# Patient Record
Sex: FEMALE | Race: WHITE | Employment: OTHER | URBAN - METROPOLITAN AREA
[De-identification: names, ages, dates, MRNs, and addresses within clinical notes are randomized per-mention and may not be internally consistent; named-entity substitution may affect disease eponyms.]

---

## 2017-05-17 ENCOUNTER — HOSPITAL ENCOUNTER (OUTPATIENT)
Dept: INTERVENTIONAL RADIOLOGY/VASCULAR | Age: 72
Discharge: HOME | End: 2017-05-17
Payer: COMMERCIAL

## 2017-05-17 VITALS
DIASTOLIC BLOOD PRESSURE: 75 MMHG | HEART RATE: 94 BPM | OXYGEN SATURATION: 100 % | RESPIRATION RATE: 16 BRPM | HEIGHT: 65 IN | SYSTOLIC BLOOD PRESSURE: 117 MMHG | WEIGHT: 250 LBS | BODY MASS INDEX: 41.65 KG/M2 | TEMPERATURE: 97.7 F

## 2017-05-17 DIAGNOSIS — N18.6 ESRD (END STAGE RENAL DISEASE) (HCC): ICD-10-CM

## 2017-05-17 LAB
INR BLD: 1
PARTIAL THROMBOPLASTIN TIME: 24.2 SEC (ref 23–31)
PLATELET # BLD: 150 K/UL (ref 150–450)
POTASSIUM SERPL-SCNC: 3.8 MMOL/L (ref 3.7–5.3)
PROTHROMBIN TIME: 10.3 SEC (ref 9.7–12)

## 2017-05-17 PROCEDURE — 2580000003 HC RX 258: Performed by: RADIOLOGY

## 2017-05-17 PROCEDURE — 85730 THROMBOPLASTIN TIME PARTIAL: CPT

## 2017-05-17 PROCEDURE — 7100000030 HC ASPR PHASE II RECOVERY - FIRST 15 MIN

## 2017-05-17 PROCEDURE — 85049 AUTOMATED PLATELET COUNT: CPT

## 2017-05-17 PROCEDURE — 84132 ASSAY OF SERUM POTASSIUM: CPT

## 2017-05-17 PROCEDURE — 85610 PROTHROMBIN TIME: CPT

## 2017-05-17 PROCEDURE — C1894 INTRO/SHEATH, NON-LASER: HCPCS

## 2017-05-17 PROCEDURE — 2500000003 HC RX 250 WO HCPCS: Performed by: RADIOLOGY

## 2017-05-17 PROCEDURE — 36415 COLL VENOUS BLD VENIPUNCTURE: CPT

## 2017-05-17 PROCEDURE — 36901 INTRO CATH DIALYSIS CIRCUIT: CPT | Performed by: RADIOLOGY

## 2017-05-17 PROCEDURE — 7100000031 HC ASPR PHASE II RECOVERY - ADDTL 15 MIN

## 2017-05-17 PROCEDURE — 6360000004 HC RX CONTRAST MEDICATION: Performed by: NURSE PRACTITIONER

## 2017-05-17 RX ORDER — ACETAMINOPHEN 325 MG/1
650 TABLET ORAL EVERY 4 HOURS PRN
Status: DISCONTINUED | OUTPATIENT
Start: 2017-05-17 | End: 2017-05-20 | Stop reason: HOSPADM

## 2017-05-17 RX ORDER — SODIUM CHLORIDE 9 MG/ML
INJECTION, SOLUTION INTRAVENOUS CONTINUOUS
Status: DISCONTINUED | OUTPATIENT
Start: 2017-05-17 | End: 2017-05-20 | Stop reason: HOSPADM

## 2017-05-17 RX ORDER — LIDOCAINE HYDROCHLORIDE 20 MG/ML
INJECTION, SOLUTION EPIDURAL; INFILTRATION; INTRACAUDAL; PERINEURAL
Status: COMPLETED | OUTPATIENT
Start: 2017-05-17 | End: 2017-05-17

## 2017-05-17 RX ADMIN — LIDOCAINE HYDROCHLORIDE 2 ML: 20 INJECTION, SOLUTION EPIDURAL; INFILTRATION; INTRACAUDAL; PERINEURAL at 10:03

## 2017-05-17 RX ADMIN — SODIUM CHLORIDE: 9 INJECTION, SOLUTION INTRAVENOUS at 09:45

## 2017-05-17 RX ADMIN — IOHEXOL 150 ML: 240 INJECTION, SOLUTION INTRATHECAL; INTRAVASCULAR; INTRAVENOUS; ORAL at 10:58

## 2017-05-17 ASSESSMENT — PAIN SCALES - GENERAL: PAINLEVEL_OUTOF10: 0

## 2017-06-05 ENCOUNTER — HOSPITAL ENCOUNTER (OUTPATIENT)
Dept: INTERVENTIONAL RADIOLOGY/VASCULAR | Age: 72
Discharge: HOME | End: 2017-06-05
Payer: COMMERCIAL

## 2017-06-05 VITALS — DIASTOLIC BLOOD PRESSURE: 81 MMHG | OXYGEN SATURATION: 100 % | SYSTOLIC BLOOD PRESSURE: 131 MMHG | HEART RATE: 93 BPM

## 2017-06-05 DIAGNOSIS — N18.6 ESRD (END STAGE RENAL DISEASE) (HCC): ICD-10-CM

## 2017-06-05 PROCEDURE — 2500000003 HC RX 250 WO HCPCS: Performed by: RADIOLOGY

## 2017-06-05 PROCEDURE — 36589 REMOVAL TUNNELED CV CATH: CPT | Performed by: RADIOLOGY

## 2017-06-05 RX ORDER — LIDOCAINE HYDROCHLORIDE 20 MG/ML
INJECTION, SOLUTION INFILTRATION; PERINEURAL
Status: COMPLETED | OUTPATIENT
Start: 2017-06-05 | End: 2017-06-05

## 2017-06-05 RX ORDER — ACETAMINOPHEN 325 MG/1
650 TABLET ORAL EVERY 4 HOURS PRN
Status: DISCONTINUED | OUTPATIENT
Start: 2017-06-05 | End: 2017-06-08 | Stop reason: HOSPADM

## 2017-06-05 RX ADMIN — LIDOCAINE HYDROCHLORIDE 6 ML: 20 INJECTION, SOLUTION INFILTRATION; PERINEURAL at 11:32

## 2017-09-30 ENCOUNTER — HOSPITAL ENCOUNTER (OUTPATIENT)
Age: 72
Setting detail: SPECIMEN
Discharge: HOME | End: 2017-09-30
Payer: COMMERCIAL

## 2017-09-30 LAB
-: ABNORMAL
AMORPHOUS: ABNORMAL
BACTERIA: ABNORMAL
BILIRUBIN URINE: NEGATIVE
CASTS UA: ABNORMAL /LPF (ref 0–2)
COLOR: YELLOW
COMMENT UA: ABNORMAL
CRYSTALS, UA: ABNORMAL /HPF
EPITHELIAL CELLS UA: ABNORMAL /HPF (ref 0–5)
GLUCOSE URINE: NEGATIVE
KETONES, URINE: NEGATIVE
LEUKOCYTE ESTERASE, URINE: ABNORMAL
MUCUS: ABNORMAL
NITRITE, URINE: NEGATIVE
OTHER OBSERVATIONS UA: ABNORMAL
PH UA: 7.5 (ref 5–8)
PROTEIN UA: ABNORMAL
RBC UA: ABNORMAL /HPF (ref 0–2)
RENAL EPITHELIAL, UA: ABNORMAL /HPF
SPECIFIC GRAVITY UA: 1.01 (ref 1–1.03)
TRICHOMONAS: ABNORMAL
TURBIDITY: ABNORMAL
URINE HGB: ABNORMAL
UROBILINOGEN, URINE: NORMAL
WBC UA: ABNORMAL /HPF (ref 0–5)
YEAST: ABNORMAL

## 2017-10-01 LAB
CULTURE: ABNORMAL
CULTURE: ABNORMAL
Lab: ABNORMAL
ORGANISM: ABNORMAL
SPECIMEN DESCRIPTION: ABNORMAL
STATUS: ABNORMAL

## 2017-10-11 ENCOUNTER — APPOINTMENT (OUTPATIENT)
Dept: CT IMAGING | Age: 72
DRG: 023 | End: 2017-10-11
Payer: COMMERCIAL

## 2017-10-11 ENCOUNTER — ANESTHESIA (OUTPATIENT)
Dept: INTERVENTIONAL RADIOLOGY/VASCULAR | Age: 72
DRG: 023 | End: 2017-10-11
Payer: COMMERCIAL

## 2017-10-11 ENCOUNTER — APPOINTMENT (OUTPATIENT)
Dept: INTERVENTIONAL RADIOLOGY/VASCULAR | Age: 72
DRG: 023 | End: 2017-10-11
Payer: COMMERCIAL

## 2017-10-11 ENCOUNTER — HOSPITAL ENCOUNTER (INPATIENT)
Dept: INTERVENTIONAL RADIOLOGY/VASCULAR | Age: 72
Discharge: HOME | DRG: 023 | End: 2017-10-11
Payer: COMMERCIAL

## 2017-10-11 ENCOUNTER — HOSPITAL ENCOUNTER (INPATIENT)
Age: 72
LOS: 8 days | Discharge: HOSPICE | DRG: 023 | End: 2017-10-19
Attending: EMERGENCY MEDICINE | Admitting: INTERNAL MEDICINE
Payer: COMMERCIAL

## 2017-10-11 ENCOUNTER — ANESTHESIA EVENT (OUTPATIENT)
Dept: INTERVENTIONAL RADIOLOGY/VASCULAR | Age: 72
DRG: 023 | End: 2017-10-11
Payer: COMMERCIAL

## 2017-10-11 VITALS — DIASTOLIC BLOOD PRESSURE: 40 MMHG | SYSTOLIC BLOOD PRESSURE: 78 MMHG | OXYGEN SATURATION: 94 %

## 2017-10-11 DIAGNOSIS — I63.9 CEREBROVASCULAR ACCIDENT (CVA), UNSPECIFIED MECHANISM (HCC): ICD-10-CM

## 2017-10-11 DIAGNOSIS — N18.6 ESRD (END STAGE RENAL DISEASE) (HCC): ICD-10-CM

## 2017-10-11 DIAGNOSIS — R29.898 WEAKNESS OF EXTREMITY: Primary | ICD-10-CM

## 2017-10-11 PROBLEM — I63.311 CEREBROVASCULAR ACCIDENT (CVA) DUE TO THROMBOSIS OF RIGHT MIDDLE CEREBRAL ARTERY (HCC): Status: ACTIVE | Noted: 2017-10-11

## 2017-10-11 PROBLEM — E66.01 MORBID OBESITY (HCC): Status: ACTIVE | Noted: 2017-10-11

## 2017-10-11 PROBLEM — G81.94 ACUTE LEFT HEMIPARESIS (HCC): Status: ACTIVE | Noted: 2017-10-11

## 2017-10-11 LAB
% CKMB: 2.1 % (ref 0–3)
ABSOLUTE EOS #: 0 K/UL (ref 0–0.4)
ABSOLUTE EOS #: 0.1 K/UL (ref 0–0.4)
ABSOLUTE LYMPH #: 0.89 K/UL (ref 1–4.8)
ABSOLUTE LYMPH #: 1.5 K/UL (ref 1–4.8)
ABSOLUTE MONO #: 0.6 K/UL (ref 0.1–1.2)
ABSOLUTE MONO #: 1.12 K/UL (ref 0.1–0.8)
ALLEN TEST: ABNORMAL
ALLEN TEST: ABNORMAL
ANION GAP SERPL CALCULATED.3IONS-SCNC: 17 MMOL/L (ref 9–17)
ANION GAP: 9 MMOL/L (ref 7–16)
BASOPHILS # BLD: 0 %
BASOPHILS # BLD: 0 %
BASOPHILS ABSOLUTE: 0 K/UL (ref 0–0.2)
BASOPHILS ABSOLUTE: 0 K/UL (ref 0–0.2)
BUN BLDV-MCNC: 49 MG/DL (ref 8–23)
BUN/CREAT BLD: ABNORMAL (ref 9–20)
CALCIUM SERPL-MCNC: 9 MG/DL (ref 8.6–10.4)
CHLORIDE BLD-SCNC: 97 MMOL/L (ref 98–107)
CK MB: 1.3 NG/ML
CKMB INTERPRETATION: ABNORMAL
CO2: 24 MMOL/L (ref 20–31)
CREAT SERPL-MCNC: 1.8 MG/DL (ref 0.5–0.9)
DIFFERENTIAL TYPE: ABNORMAL
DIFFERENTIAL TYPE: ABNORMAL
EOSINOPHILS RELATIVE PERCENT: 0 %
EOSINOPHILS RELATIVE PERCENT: 1 %
FIO2: 50
FIO2: ABNORMAL
GFR AFRICAN AMERICAN: 33 ML/MIN
GFR NON-AFRICAN AMERICAN: 20 ML/MIN
GFR NON-AFRICAN AMERICAN: 28 ML/MIN
GFR SERPL CREATININE-BSD FRML MDRD: 24 ML/MIN
GFR SERPL CREATININE-BSD FRML MDRD: ABNORMAL ML/MIN/{1.73_M2}
GLUCOSE BLD-MCNC: 91 MG/DL (ref 70–99)
GLUCOSE BLD-MCNC: 93 MG/DL (ref 74–100)
HCO3 VENOUS: 30.8 MMOL/L (ref 22–29)
HCT VFR BLD CALC: 35.9 % (ref 36–46)
HCT VFR BLD CALC: 43.8 % (ref 36–46)
HEMOGLOBIN: 12 G/DL (ref 12–16)
HEMOGLOBIN: 14.6 G/DL (ref 12–16)
INR BLD: 0.9
LYMPHOCYTES # BLD: 21 %
LYMPHOCYTES # BLD: 4 %
MCH RBC QN AUTO: 30.6 PG (ref 26–34)
MCH RBC QN AUTO: 31.3 PG (ref 26–34)
MCHC RBC AUTO-ENTMCNC: 33.5 G/DL (ref 31–37)
MCHC RBC AUTO-ENTMCNC: 33.5 G/DL (ref 31–37)
MCV RBC AUTO: 91.2 FL (ref 80–100)
MCV RBC AUTO: 93.5 FL (ref 80–100)
MODE: ABNORMAL
MODE: ABNORMAL
MONOCYTES # BLD: 5 %
MONOCYTES # BLD: 8 %
MORPHOLOGY: NORMAL
MYOGLOBIN: 132 NG/ML (ref 25–58)
NEGATIVE BASE EXCESS, ART: 1 (ref 0–2)
NEGATIVE BASE EXCESS, VEN: ABNORMAL (ref 0–2)
O2 DEVICE/FLOW/%: ABNORMAL
O2 DEVICE/FLOW/%: ABNORMAL
O2 SAT, VEN: 80 % (ref 60–85)
PARTIAL THROMBOPLASTIN TIME: 21.4 SEC (ref 21.3–31.3)
PATIENT TEMP: ABNORMAL
PATIENT TEMP: ABNORMAL
PCO2, VEN: 47 MM HG (ref 41–51)
PDW BLD-RTO: 13.4 % (ref 12.5–15.4)
PDW BLD-RTO: 14.5 % (ref 12.5–15.4)
PH VENOUS: 7.42 (ref 7.32–7.43)
PLATELET # BLD: 119 K/UL (ref 140–450)
PLATELET # BLD: 196 K/UL (ref 140–450)
PLATELET ESTIMATE: ABNORMAL
PLATELET ESTIMATE: ABNORMAL
PMV BLD AUTO: 8.6 FL (ref 6–12)
PMV BLD AUTO: 8.8 FL (ref 6–12)
PO2, VEN: 43.4 MM HG (ref 30–50)
POC CHLORIDE: 101 MMOL/L (ref 98–107)
POC CREATININE: 2.39 MG/DL (ref 0.51–1.19)
POC HCO3: 23.5 MMOL/L (ref 21–28)
POC HEMATOCRIT: 40 % (ref 36–46)
POC HEMATOCRIT: 48 % (ref 36–46)
POC HEMOGLOBIN: 13.5 G/DL (ref 12–16)
POC HEMOGLOBIN: 16.4 G/DL (ref 12–16)
POC IONIZED CALCIUM: 1.16 MMOL/L (ref 1.15–1.33)
POC LACTIC ACID: 1.19 MMOL/L (ref 0.56–1.39)
POC O2 SATURATION: 98 % (ref 94–98)
POC PCO2 TEMP: ABNORMAL MM HG
POC PCO2 TEMP: ABNORMAL MM HG
POC PCO2: 37.5 MM HG (ref 35–48)
POC PH TEMP: ABNORMAL
POC PH TEMP: ABNORMAL
POC PH: 7.41 (ref 7.35–7.45)
POC PO2 TEMP: ABNORMAL MM HG
POC PO2 TEMP: ABNORMAL MM HG
POC PO2: 112.9 MM HG (ref 83–108)
POC POTASSIUM: 4 MMOL/L (ref 3.5–4.5)
POC SODIUM: 141 MMOL/L (ref 138–146)
POC TROPONIN I: 0 NG/ML (ref 0–0.1)
POC TROPONIN INTERP: NORMAL
POSITIVE BASE EXCESS, ART: ABNORMAL (ref 0–3)
POSITIVE BASE EXCESS, VEN: 5 (ref 0–3)
POTASSIUM SERPL-SCNC: 3.9 MMOL/L (ref 3.7–5.3)
PROTHROMBIN TIME: 10.1 SEC (ref 9.4–12.6)
RBC # BLD: 3.93 M/UL (ref 4–5.2)
RBC # BLD: 4.68 M/UL (ref 4–5.2)
RBC # BLD: ABNORMAL 10*6/UL
RBC # BLD: ABNORMAL 10*6/UL
SAMPLE SITE: ABNORMAL
SAMPLE SITE: ABNORMAL
SEG NEUTROPHILS: 70 %
SEG NEUTROPHILS: 91 %
SEGMENTED NEUTROPHILS ABSOLUTE COUNT: 20.29 K/UL (ref 1.8–7.7)
SEGMENTED NEUTROPHILS ABSOLUTE COUNT: 4.8 K/UL (ref 1.8–7.7)
SODIUM BLD-SCNC: 138 MMOL/L (ref 135–144)
TCO2 (CALC), ART: 25 MMOL/L (ref 22–29)
TOTAL CK: 61 U/L (ref 26–192)
TOTAL CO2, VENOUS: 32 MMOL/L (ref 23–30)
TROPONIN INTERP: ABNORMAL
TROPONIN T: <0.03 NG/ML
WBC # BLD: 22.3 K/UL (ref 3.5–11)
WBC # BLD: 6.9 K/UL (ref 3.5–11)
WBC # BLD: ABNORMAL 10*3/UL
WBC # BLD: ABNORMAL 10*3/UL

## 2017-10-11 PROCEDURE — 93005 ELECTROCARDIOGRAM TRACING: CPT

## 2017-10-11 PROCEDURE — 51702 INSERT TEMP BLADDER CATH: CPT

## 2017-10-11 PROCEDURE — 3E03317 INTRODUCTION OF OTHER THROMBOLYTIC INTO PERIPHERAL VEIN, PERCUTANEOUS APPROACH: ICD-10-PCS | Performed by: PSYCHIATRY & NEUROLOGY

## 2017-10-11 PROCEDURE — 2500000003 HC RX 250 WO HCPCS

## 2017-10-11 PROCEDURE — 61630 BALO ANGIOPLASTY ICR PERQ: CPT | Performed by: PSYCHIATRY & NEUROLOGY

## 2017-10-11 PROCEDURE — B41FYZZ FLUOROSCOPY OF RIGHT LOWER EXTREMITY ARTERIES USING OTHER CONTRAST: ICD-10-PCS | Performed by: PSYCHIATRY & NEUROLOGY

## 2017-10-11 PROCEDURE — 85730 THROMBOPLASTIN TIME PARTIAL: CPT

## 2017-10-11 PROCEDURE — 82330 ASSAY OF CALCIUM: CPT

## 2017-10-11 PROCEDURE — 82435 ASSAY OF BLOOD CHLORIDE: CPT

## 2017-10-11 PROCEDURE — B316YZZ FLUOROSCOPY OF RIGHT INTERNAL CAROTID ARTERY USING OTHER CONTRAST: ICD-10-PCS | Performed by: PSYCHIATRY & NEUROLOGY

## 2017-10-11 PROCEDURE — B313YZZ FLUOROSCOPY OF RIGHT COMMON CAROTID ARTERY USING OTHER CONTRAST: ICD-10-PCS | Performed by: PSYCHIATRY & NEUROLOGY

## 2017-10-11 PROCEDURE — 85025 COMPLETE CBC W/AUTO DIFF WBC: CPT

## 2017-10-11 PROCEDURE — 2000000003 HC NEURO ICU R&B

## 2017-10-11 PROCEDURE — 70450 CT HEAD/BRAIN W/O DYE: CPT

## 2017-10-11 PROCEDURE — 83874 ASSAY OF MYOGLOBIN: CPT

## 2017-10-11 PROCEDURE — 85610 PROTHROMBIN TIME: CPT

## 2017-10-11 PROCEDURE — 83605 ASSAY OF LACTIC ACID: CPT

## 2017-10-11 PROCEDURE — 6360000002 HC RX W HCPCS: Performed by: NURSE ANESTHETIST, CERTIFIED REGISTERED

## 2017-10-11 PROCEDURE — 86920 COMPATIBILITY TEST SPIN: CPT

## 2017-10-11 PROCEDURE — 3700000001 HC ADD 15 MINUTES (ANESTHESIA)

## 2017-10-11 PROCEDURE — 82550 ASSAY OF CK (CPK): CPT

## 2017-10-11 PROCEDURE — C1887 CATHETER, GUIDING: HCPCS

## 2017-10-11 PROCEDURE — 2500000003 HC RX 250 WO HCPCS: Performed by: NURSE ANESTHETIST, CERTIFIED REGISTERED

## 2017-10-11 PROCEDURE — 84484 ASSAY OF TROPONIN QUANT: CPT

## 2017-10-11 PROCEDURE — 99285 EMERGENCY DEPT VISIT HI MDM: CPT

## 2017-10-11 PROCEDURE — 2580000003 HC RX 258: Performed by: NURSE ANESTHETIST, CERTIFIED REGISTERED

## 2017-10-11 PROCEDURE — 82803 BLOOD GASES ANY COMBINATION: CPT

## 2017-10-11 PROCEDURE — 87641 MR-STAPH DNA AMP PROBE: CPT

## 2017-10-11 PROCEDURE — 86901 BLOOD TYPING SEROLOGIC RH(D): CPT

## 2017-10-11 PROCEDURE — 84295 ASSAY OF SERUM SODIUM: CPT

## 2017-10-11 PROCEDURE — 6360000002 HC RX W HCPCS: Performed by: EMERGENCY MEDICINE

## 2017-10-11 PROCEDURE — 6360000004 HC RX CONTRAST MEDICATION: Performed by: PSYCHIATRY & NEUROLOGY

## 2017-10-11 PROCEDURE — 84132 ASSAY OF SERUM POTASSIUM: CPT

## 2017-10-11 PROCEDURE — 82565 ASSAY OF CREATININE: CPT

## 2017-10-11 PROCEDURE — 82947 ASSAY GLUCOSE BLOOD QUANT: CPT

## 2017-10-11 PROCEDURE — 70498 CT ANGIOGRAPHY NECK: CPT

## 2017-10-11 PROCEDURE — 82553 CREATINE MB FRACTION: CPT

## 2017-10-11 PROCEDURE — 70496 CT ANGIOGRAPHY HEAD: CPT

## 2017-10-11 PROCEDURE — 6360000004 HC RX CONTRAST MEDICATION: Performed by: EMERGENCY MEDICINE

## 2017-10-11 PROCEDURE — 36430 TRANSFUSION BLD/BLD COMPNT: CPT

## 2017-10-11 PROCEDURE — 86850 RBC ANTIBODY SCREEN: CPT

## 2017-10-11 PROCEDURE — 6360000002 HC RX W HCPCS: Performed by: NEUROLOGICAL SURGERY

## 2017-10-11 PROCEDURE — P9016 RBC LEUKOCYTES REDUCED: HCPCS

## 2017-10-11 PROCEDURE — 85014 HEMATOCRIT: CPT

## 2017-10-11 PROCEDURE — 80048 BASIC METABOLIC PNL TOTAL CA: CPT

## 2017-10-11 PROCEDURE — 86900 BLOOD TYPING SEROLOGIC ABO: CPT

## 2017-10-11 PROCEDURE — 3700000000 HC ANESTHESIA ATTENDED CARE

## 2017-10-11 PROCEDURE — 2580000003 HC RX 258: Performed by: NEUROLOGICAL SURGERY

## 2017-10-11 PROCEDURE — 61645 PERQ ART M-THROMBECT &/NFS: CPT | Performed by: PSYCHIATRY & NEUROLOGY

## 2017-10-11 PROCEDURE — 6360000002 HC RX W HCPCS

## 2017-10-11 PROCEDURE — 03CG3ZZ EXTIRPATION OF MATTER FROM INTRACRANIAL ARTERY, PERCUTANEOUS APPROACH: ICD-10-PCS | Performed by: PSYCHIATRY & NEUROLOGY

## 2017-10-11 RX ORDER — IODIXANOL 270 MG/ML
200 INJECTION, SOLUTION INTRAVASCULAR
Status: COMPLETED | OUTPATIENT
Start: 2017-10-11 | End: 2017-10-11

## 2017-10-11 RX ORDER — SODIUM CHLORIDE 0.9 % (FLUSH) 0.9 %
10 SYRINGE (ML) INJECTION EVERY 12 HOURS SCHEDULED
Status: DISCONTINUED | OUTPATIENT
Start: 2017-10-11 | End: 2017-10-18

## 2017-10-11 RX ORDER — ACETAMINOPHEN 325 MG/1
650 TABLET ORAL EVERY 4 HOURS PRN
Status: DISCONTINUED | OUTPATIENT
Start: 2017-10-11 | End: 2017-10-18

## 2017-10-11 RX ORDER — EPHEDRINE SULFATE 50 MG/ML
INJECTION, SOLUTION INTRAVENOUS PRN
Status: DISCONTINUED | OUTPATIENT
Start: 2017-10-11 | End: 2017-10-11 | Stop reason: SDUPTHER

## 2017-10-11 RX ORDER — SODIUM CHLORIDE 9 MG/ML
INJECTION, SOLUTION INTRAVENOUS CONTINUOUS PRN
Status: DISCONTINUED | OUTPATIENT
Start: 2017-10-11 | End: 2017-10-11 | Stop reason: SDUPTHER

## 2017-10-11 RX ORDER — ACETAMINOPHEN 325 MG/1
650 TABLET ORAL EVERY 4 HOURS PRN
Status: DISCONTINUED | OUTPATIENT
Start: 2017-10-11 | End: 2017-10-11 | Stop reason: SDUPTHER

## 2017-10-11 RX ORDER — ATORVASTATIN CALCIUM 40 MG/1
40 TABLET, FILM COATED ORAL NIGHTLY
Status: DISCONTINUED | OUTPATIENT
Start: 2017-10-11 | End: 2017-10-18

## 2017-10-11 RX ORDER — HEPARIN SODIUM 1000 [USP'U]/ML
INJECTION, SOLUTION INTRAVENOUS; SUBCUTANEOUS PRN
Status: DISCONTINUED | OUTPATIENT
Start: 2017-10-11 | End: 2017-10-11 | Stop reason: SDUPTHER

## 2017-10-11 RX ORDER — ATORVASTATIN CALCIUM 80 MG/1
80 TABLET, FILM COATED ORAL NIGHTLY
Status: DISCONTINUED | OUTPATIENT
Start: 2017-10-11 | End: 2017-10-11

## 2017-10-11 RX ORDER — SODIUM CHLORIDE 0.9 % (FLUSH) 0.9 %
10 SYRINGE (ML) INJECTION PRN
Status: DISCONTINUED | OUTPATIENT
Start: 2017-10-11 | End: 2017-10-18

## 2017-10-11 RX ORDER — FENTANYL CITRATE 50 UG/ML
INJECTION, SOLUTION INTRAMUSCULAR; INTRAVENOUS PRN
Status: DISCONTINUED | OUTPATIENT
Start: 2017-10-11 | End: 2017-10-11 | Stop reason: SDUPTHER

## 2017-10-11 RX ORDER — CEFAZOLIN SODIUM 1 G/3ML
INJECTION, POWDER, FOR SOLUTION INTRAMUSCULAR; INTRAVENOUS PRN
Status: DISCONTINUED | OUTPATIENT
Start: 2017-10-11 | End: 2017-10-11 | Stop reason: SDUPTHER

## 2017-10-11 RX ORDER — 0.9 % SODIUM CHLORIDE 0.9 %
250 INTRAVENOUS SOLUTION INTRAVENOUS ONCE
Status: DISCONTINUED | OUTPATIENT
Start: 2017-10-11 | End: 2017-10-18

## 2017-10-11 RX ORDER — SODIUM CHLORIDE, SODIUM LACTATE, POTASSIUM CHLORIDE, CALCIUM CHLORIDE 600; 310; 30; 20 MG/100ML; MG/100ML; MG/100ML; MG/100ML
INJECTION, SOLUTION INTRAVENOUS CONTINUOUS PRN
Status: DISCONTINUED | OUTPATIENT
Start: 2017-10-11 | End: 2017-10-11 | Stop reason: SDUPTHER

## 2017-10-11 RX ORDER — DEXTROSE MONOHYDRATE 25 G/50ML
12.5 INJECTION, SOLUTION INTRAVENOUS
Status: ACTIVE | OUTPATIENT
Start: 2017-10-11 | End: 2017-10-11

## 2017-10-11 RX ORDER — ONDANSETRON 2 MG/ML
4 INJECTION INTRAMUSCULAR; INTRAVENOUS EVERY 6 HOURS PRN
Status: DISCONTINUED | OUTPATIENT
Start: 2017-10-11 | End: 2017-10-18

## 2017-10-11 RX ORDER — ONDANSETRON 2 MG/ML
INJECTION INTRAMUSCULAR; INTRAVENOUS PRN
Status: DISCONTINUED | OUTPATIENT
Start: 2017-10-11 | End: 2017-10-11 | Stop reason: SDUPTHER

## 2017-10-11 RX ADMIN — SODIUM CHLORIDE: 9 INJECTION, SOLUTION INTRAVENOUS at 17:27

## 2017-10-11 RX ADMIN — PHENYLEPHRINE HYDROCHLORIDE 100 MCG: 10 INJECTION INTRAMUSCULAR; INTRAVENOUS; SUBCUTANEOUS at 20:15

## 2017-10-11 RX ADMIN — IOPAMIDOL 90 ML: 755 INJECTION, SOLUTION INTRAVENOUS at 17:08

## 2017-10-11 RX ADMIN — FENTANYL CITRATE 50 MCG: 50 INJECTION INTRAMUSCULAR; INTRAVENOUS at 17:39

## 2017-10-11 RX ADMIN — EPHEDRINE SULFATE 5 MG: 50 INJECTION INTRAMUSCULAR; INTRAVENOUS; SUBCUTANEOUS at 21:10

## 2017-10-11 RX ADMIN — PHENYLEPHRINE HYDROCHLORIDE 100 MCG: 10 INJECTION INTRAMUSCULAR; INTRAVENOUS; SUBCUTANEOUS at 20:52

## 2017-10-11 RX ADMIN — EPHEDRINE SULFATE 5 MG: 50 INJECTION INTRAMUSCULAR; INTRAVENOUS; SUBCUTANEOUS at 22:10

## 2017-10-11 RX ADMIN — PHENYLEPHRINE HYDROCHLORIDE 100 MCG: 10 INJECTION INTRAMUSCULAR; INTRAVENOUS; SUBCUTANEOUS at 22:10

## 2017-10-11 RX ADMIN — IODIXANOL 165 ML: 270 INJECTION, SOLUTION INTRAVASCULAR at 21:42

## 2017-10-11 RX ADMIN — PHENYLEPHRINE HYDROCHLORIDE 100 MCG/MIN: 10 INJECTION INTRAVENOUS at 23:30

## 2017-10-11 RX ADMIN — PHENYLEPHRINE HYDROCHLORIDE 50 MCG/MIN: 10 INJECTION INTRAMUSCULAR; INTRAVENOUS; SUBCUTANEOUS at 20:52

## 2017-10-11 RX ADMIN — SODIUM CHLORIDE, POTASSIUM CHLORIDE, SODIUM LACTATE AND CALCIUM CHLORIDE: 600; 310; 30; 20 INJECTION, SOLUTION INTRAVENOUS at 19:50

## 2017-10-11 RX ADMIN — EPHEDRINE SULFATE 10 MG: 50 INJECTION INTRAMUSCULAR; INTRAVENOUS; SUBCUTANEOUS at 22:14

## 2017-10-11 RX ADMIN — ALTEPLASE 81 MG: KIT at 17:13

## 2017-10-11 RX ADMIN — PHENYLEPHRINE HYDROCHLORIDE 400 MCG: 10 INJECTION INTRAMUSCULAR; INTRAVENOUS; SUBCUTANEOUS at 20:36

## 2017-10-11 RX ADMIN — FENTANYL CITRATE 25 MCG: 50 INJECTION INTRAMUSCULAR; INTRAVENOUS at 20:42

## 2017-10-11 RX ADMIN — HEPARIN SODIUM 1000 UNITS: 1000 INJECTION, SOLUTION INTRAVENOUS; SUBCUTANEOUS at 20:00

## 2017-10-11 RX ADMIN — PHENYLEPHRINE HYDROCHLORIDE 200 MCG: 10 INJECTION INTRAMUSCULAR; INTRAVENOUS; SUBCUTANEOUS at 20:25

## 2017-10-11 RX ADMIN — HEPARIN SODIUM 2000 UNITS: 1000 INJECTION, SOLUTION INTRAVENOUS; SUBCUTANEOUS at 17:59

## 2017-10-11 RX ADMIN — PHENYLEPHRINE HYDROCHLORIDE 100 MCG: 10 INJECTION INTRAMUSCULAR; INTRAVENOUS; SUBCUTANEOUS at 20:21

## 2017-10-11 RX ADMIN — ONDANSETRON 4 MG: 2 INJECTION INTRAMUSCULAR; INTRAVENOUS at 20:34

## 2017-10-11 RX ADMIN — EPHEDRINE SULFATE 5 MG: 50 INJECTION INTRAMUSCULAR; INTRAVENOUS; SUBCUTANEOUS at 20:56

## 2017-10-11 RX ADMIN — CEFAZOLIN 2000 MG: 1 INJECTION, POWDER, FOR SOLUTION INTRAMUSCULAR; INTRAVENOUS at 17:41

## 2017-10-11 RX ADMIN — FENTANYL CITRATE 25 MCG: 50 INJECTION INTRAMUSCULAR; INTRAVENOUS at 20:41

## 2017-10-11 ASSESSMENT — ENCOUNTER SYMPTOMS
DIARRHEA: 0
SHORTNESS OF BREATH: 0
COUGH: 0
ABDOMINAL PAIN: 0
VOMITING: 0
NAUSEA: 0
BLURRED VISION: 0
SORE THROAT: 0
HEARTBURN: 0
CHEST TIGHTNESS: 0
CONSTIPATION: 0

## 2017-10-11 NOTE — ED PROVIDER NOTES
Psychiatric/Behavioral: Negative for suicidal ideas. PHYSICAL EXAM   (up to 7 for level 4, 8 or more for level 5)      INITIAL VITALS:   BP (!) 149/79   Pulse 106   Temp 98.1 °F (36.7 °C) (Oral)   Resp 18   Ht 5' 5\" (1.651 m)   Wt 260 lb (117.9 kg)   LMP  (LMP Unknown) Comment: post menopausal  SpO2 97%   BMI 43.27 kg/m²     Physical Exam   Constitutional: She is oriented to person, place, and time. She appears well-developed and well-nourished. No distress. HENT:   Head: Normocephalic and atraumatic. Right Ear: External ear normal.   Left Ear: External ear normal.   Nose: Nose normal.   Eyes: Conjunctivae and EOM are normal. Pupils are equal, round, and reactive to light. Right eye exhibits no discharge. Left eye exhibits no discharge. Neck: Normal range of motion. Neck supple. No JVD present. No tracheal deviation present. Cardiovascular: Regular rhythm and normal heart sounds. Tachycardia present. Exam reveals no gallop and no friction rub. No murmur heard. Pulmonary/Chest: Effort normal and breath sounds normal. No stridor. No respiratory distress. She has no wheezes. She has no rales. She exhibits no tenderness. Abdominal: Soft. Bowel sounds are normal. She exhibits no distension and no mass. There is no tenderness. There is no rebound and no guarding. Musculoskeletal: Normal range of motion. She exhibits no edema, tenderness or deformity. Lymphadenopathy:     She has no cervical adenopathy. Neurological: She is alert and oriented to person, place, and time. A sensory deficit is present. She exhibits abnormal muscle tone. Gait abnormal. GCS eye subscore is 4. GCS verbal subscore is 5. GCS motor subscore is 6.    Mental Status:  A & O x3,awake             Cranial Nerves:    V: Corneal reflex:  not completed  VII: Facial strength: abnormal left-sided facial asymmetry, unequal smile  XI: Shoulder shrug:  abnormal decreased shoulder shrug on the left    Motor Exam:    Drift: signs per transfusion protocol    Transfusion Reaction Management    Verify informed consent    Inpatient consult to Neurology    Inpatient consult to Internal Medicine    Initiate Oxygen Therapy Protocol    Venous Blood Gas, POC    Creatinine W/GFR Point of Care    Lactic Acid, POC    POCT Glucose    Anion Gap (Calc) POC    POCT troponin    TYPE AND SCREEN    Insert peripheral IV    Initiate minimum 2 IV lines for alteplase (tPA) infusion    PATIENT STATUS (FROM ED OR OR/PROCEDURAL) Inpatient    Fall precautions       MEDICATIONS ORDERED:  Orders Placed This Encounter   Medications    sodium chloride flush 0.9 % injection 10 mL    sodium chloride flush 0.9 % injection 10 mL    dextrose 50 % solution 12.5 g    FOLLOWED BY Linked Order Group     alteplase (ACTIVASE) injection 9 mg     alteplase (ACTIVASE) injection 81 mg    iopamidol (ISOVUE-370) 76 % injection 90 mL    0.9 % sodium chloride infusion 250 mL       DDX: Stroke, SAH, subdural/epidural hematoma, hyponatremia, hypoglycemia, MI      NIH: 10  DIAGNOSTIC RESULTS / EMERGENCY DEPARTMENT COURSE / MDM     LABS:  Results for orders placed or performed during the hospital encounter of 10/11/17   STROKE PANEL   Result Value Ref Range    WBC 6.9 3.5 - 11.0 k/uL    RBC 4.68 4.0 - 5.2 m/uL    Hemoglobin 14.6 12.0 - 16.0 g/dL    Hematocrit 43.8 36 - 46 %    MCV 93.5 80 - 100 fL    MCH 31.3 26 - 34 pg    MCHC 33.5 31 - 37 g/dL    RDW 13.4 12.5 - 15.4 %    Platelets 338 (L) 127 - 450 k/uL    MPV 8.6 6.0 - 12.0 fL    Differential Type NOT REPORTED     Seg Neutrophils 70 %    Lymphocytes 21 %    Monocytes 8 %    Eosinophils % 1 %    Basophils 0 %    Segs Absolute 4.80 1.8 - 7.7 k/uL    Absolute Lymph # 1.50 1.0 - 4.8 k/uL    Absolute Mono # 0.60 0.1 - 1.2 k/uL    Absolute Eos # 0.10 0.0 - 0.4 k/uL    Basophils # 0.00 0.0 - 0.2 k/uL    WBC Morphology NOT REPORTED     RBC Morphology NOT REPORTED     Platelet Estimate NOT REPORTED     Protime 10.1 9.4 Prominent great vessels are somewhat tortuous. The left vertebral artery originates from the arch just proximal to the left subclavian artery. CAROTID ARTERIES: The common carotid arteries are normal in appearance without evidence of a flow limiting stenosis. The internal carotid arteries are normal in appearance without evidence of a flow limiting stenosis by NASCET criteria. No dissection or arterial injury is seen. Distal cervical internal carotid arteries are tortuous bilaterally. There is small amount of plaque in the carotid bulbs, left greater than right, but no significant stenosis. VERTEBRAL ARTERIES: The left vertebral origin appears to be at the arch just proximal to the subclavian artery. There is local calcified plaque causing possible moderate stenosis. The right vertebral artery appears normal. SOFT TISSUES:  The lung apices are clear. No cervical or superior mediastinal lymphadenopathy. The visualized portion of the larynx and pharynx appear unremarkable. The parotid, submandibular and thyroid glands demonstrate no acute abnormality. There is an 8 mm calcified lesion in the right lobe of the thyroid. BONES: The visualized osseous structures appear unremarkable. Possible moderate stenosis at the origin of the left vertebral artery at the arch of the aorta. Mild plaque in the carotid bulbs, left greater than right, with no significant stenosis. RECOMMENDATIONS: Managing Incidental Thyroid Nodule Detected at CT or MRI or US 1. Further evaluation by thyroid Ultrasound recommended for these incidental nodules: Patient Age 25 years or less - Any nodule. Patient Age 21-27 years old - Nodule 1 cm in size or greater Patient Age 28 years or more - Nodule 1.5 cm in size or greater 2.  Follow up thyroid ultrasound also recommend in these scenarios -Solitary nodule with high risk imaging features (locally invasive nodule or suspicious lymph nodes) -Any nodule in a heterogeneous enlarged thyroid gland 3.  NO further imaging is recommended in the following scenarios -No f/u imaging is recommended for ITNs not meeting the above criteria. -No US or f/u recommended for ITNs without high risk features in pts. with limited life expectancy or significant co-morbidities, unless clinically warranted. Note: These recommendations do not apply to pts. w/ increased risk for thyroid cancer or pts. with symptomatic thyroid disease. ________________________________________________________________ Recommendations for f/u of Incidental Thyroid Nodules (ITN) found on CT, MR, NM and Extrathyroidal US are based upon the ACR white paper and [facility] 3-tiered system for managing ITNs: J Am Yadira Radiol. 2015 Feb;12(2): 143-50     Cta Head W Contrast    Result Date: 10/11/2017  EXAMINATION: CTA OF THE HEAD WITH CONTRAST  10/11/2017 5:15 pm: TECHNIQUE: CTA of the head/brain was performed with the administration of intravenous contrast. Multiplanar reformatted images are provided for review. MIP images are provided for review. Dose modulation, iterative reconstruction, and/or weight based adjustment of the mA/kV was utilized to reduce the radiation dose to as low as reasonably achievable. Dynamic imaging included. COMPARISON: Noncontrast CT of the brain from today HISTORY: ORDERING SYSTEM PROVIDED HISTORY: STROKE FINDINGS: ANTERIOR CIRCULATION: There is calcified plaque in the cavernous segments bilaterally causing moderate stenosis on the left and minimal if any on the right. The P2 segments appear normal.  The distal cervical segments are tortuous. The anterior cerebral arteries appear normal bilaterally. The anterior communicating artery is patent. The left middle cerebral artery appears normal. There is complete or near complete occlusion of the distal M1 segment of the right middle cerebral artery approximately 8 mm in length.   The M2 and M3 branches appear in normal with increased collateral flow noted on venous and delayed

## 2017-10-11 NOTE — ED NOTES
Pt last known well now approximately 1540 per [facility] Alan Antonio RN  10/11/17 2376

## 2017-10-11 NOTE — ED NOTES
Bed: 07  Expected date:   Expected time:   Means of arrival:   Comments:  lizbet Bradley RN  10/11/17 [id number]

## 2017-10-11 NOTE — ANESTHESIA PRE PROCEDURE
PRN Amparo Whitaekr, DO        dextrose 50 % solution 12.5 g  12.5 g Intravenous Once PRN Marion Goodman, DO        fentaNYL (SUBLIMAZE) injection    PRN Garnica Bloodgood, CRNA   50 mcg at 10/11/17 1739    ceFAZolin (ANCEF) injection   Intravenous PRN Garnica Bloodgood, CRNA   2,000 mg at 10/11/17 1741    0.9 % sodium chloride infusion    Continuous PRN Garnica Bloodgood, CRNA        heparin (porcine) injection    PRN Garnica Bloodgood, CRNA   2,000 Units at 10/11/17 1759    0.9 % sodium chloride infusion 250 mL  250 mL Intravenous Once Dot Callejas MD           Allergies: Allergies   Allergen Reactions    Other      disolvable sutures cause infection       Problem List:    Patient Active Problem List   Diagnosis Code    HTN (hypertension) I10    Hep C w/o coma, chronic (HCC) B18.2    Inadequate dialysis POS4462    ESRD on hemodialysis ([address] N18.6, Z99.2    ESRD (end stage renal disease) ([address] N18.6    Morbid obesity ([address] E66.01    Acute left hemiparesis ([address] G81.94    Cerebrovascular accident (CVA) due to thrombosis of right middle cerebral artery ([address] I63.311       Past Medical History:        Diagnosis Date    Arthritis     Blood circulation, collateral     legs    Cancer ([address]     uterine. Hysterectomy in 1978    Chronic kidney disease 2016    Hemodialysis patient ([address]     [facility]ie [facility] on [facility], sat    History of blood transfusion     Hypertension     Liver disease     Hepatitis C    Neuromuscular disorder ([address]     Scleroderma       Past Surgical History:        Procedure Laterality Date    CHOLECYSTECTOMY      DIALYSIS FISTULA CREATION      HYSTERECTOMY      TONSILLECTOMY      TUNNELED VENOUS CATHETER PLACEMENT Right 09/14/2016    Rt.  IJ judy split insertion/ exchange over [place]    TUNNELED VENOUS CATHETER PLACEMENT Right 10/10/2016    REMOVAL NONFUNCTIONING HD CATH, EXCANGED WITH NEW       Social History:    Social History   Substance Use Topics    Smoking status: Never Smoker    Smokeless tobacco: Never Used    Alcohol use No                                Counseling given: Not Answered      Vital Signs (Current): There were no vitals filed for this visit.                                            BP Readings from Last 3 Encounters:   10/11/17 (!) 149/79   10/11/17 (!) 179/77   06/05/17 131/81       NPO Status:                                                                                 BMI:   Wt Readings from Last 3 Encounters:   10/11/17 260 lb (117.9 kg)   05/17/17 250 lb (113.4 kg)   10/24/16 274 lb (124.3 kg)     There is no height or weight on file to calculate BMI.    CBC:   Lab Results   Component Value Date    WBC 6.9 10/11/2017    RBC 4.68 10/11/2017    HGB 14.6 10/11/2017    HCT 43.8 10/11/2017    MCV 93.5 10/11/2017    RDW 13.4 10/11/2017     10/11/2017       CMP:   Lab Results   Component Value Date     10/11/2017    K 3.9 10/11/2017    CL 97 10/11/2017    CO2 24 10/11/2017    BUN 49 10/11/2017    CREATININE 1.80 10/11/2017    GFRAA 33 10/11/2017    LABGLOM 28 10/11/2017    GLUCOSE 91 10/11/2017    PROT 6.5 11/19/2016    CALCIUM 9.0 10/11/2017    BILITOT 0.64 11/19/2016    ALKPHOS 70 11/19/2016    AST 22 11/19/2016    ALT 14 11/19/2016       POC Tests:   Recent Labs      10/11/17   1602   POCGLU  93   POCNA  141   POCK  4.0   POCCL  101   POCHEMO  16.4*   POCHCT  48*       Coags:   Lab Results   Component Value Date    PROTIME 10.1 10/11/2017    INR 0.9 10/11/2017    APTT 21.4 10/11/2017       HCG (If Applicable): No results found for: PREGTESTUR, PREGSERUM, HCG, HCGQUANT     ABGs: No results found for: PHART, PO2ART, IKV1CKH, SVO0PCT, BEART, [id number]     Type & Screen (If Applicable):  No results found for: [facility]    Anesthesia Evaluation  Patient summary reviewed no history of anesthetic complications:   Airway: Mallampati: II        Dental: normal exam         Pulmonary:negative ROS   (+) decreased breath sounds, Cardiovascular:    (+) hypertension:,         Rhythm: regular                      Neuro/Psych:   (+) CVA:, neuromuscular disease:,                Comments: Sudden onset left hemiparesis and right gaze from MCA thrombus. Patient is responding to verbals: speech delayed. GI/Hepatic/Renal:   (+) hepatitis:, liver disease:, renal disease: ESRD and dialysis,           Endo/Other: negative ROS                    Abdominal:   (+) obese,         Vascular:                                      Anesthesia Plan      general, MAC and TIVA     ASA 4 - emergent     (ASA 4E  Patient was evaluated prior to the procedure. Family not immediately present.)  Induction: intravenous.                           Althea Mejias MD   10/11/2017

## 2017-10-11 NOTE — CONSULTS
smokeless tobacco.  Family History:   History reviewed. No pertinent family history. REVIEW OF SYSTEMS:  Complete review of systems negative except as stated in the patient's HPI. PHYSICAL EXAM:  VITALS:  BP (!) 139/111   Pulse 102   Temp 98.1 °F (36.7 °C) (Oral)   Resp 18   Ht 5' 5\" (1.651 m)   Wt 260 lb (117.9 kg)   LMP  (LMP Unknown) Comment: post menopausal  SpO2 94%   BMI 43.27 kg/m²   NEUROLOGIC:  Alert and oriented in all spheres, R gaze preference, L lower facial droop, dense LUE weakness, LLE does not move against gravity. DATA:  CBC:   Lab Results   Component Value Date    WBC 6.9 10/11/2017    RBC 4.68 10/11/2017    HGB 14.6 10/11/2017    HCT 43.8 10/11/2017    MCV 93.5 10/11/2017    MCH 31.3 10/11/2017    MCHC 33.5 10/11/2017    RDW 13.4 10/11/2017     10/11/2017    MPV 8.6 10/11/2017     CMP:    Lab Results   Component Value Date     10/11/2017    K 3.9 10/11/2017    CL 97 10/11/2017    CO2 24 10/11/2017    BUN 49 10/11/2017    CREATININE 1.80 10/11/2017    GFRAA 33 10/11/2017    LABGLOM 28 10/11/2017    GLUCOSE 91 10/11/2017    PROT 6.5 11/19/2016    LABALBU 3.3 11/19/2016    CALCIUM 9.0 10/11/2017    BILITOT 0.64 11/19/2016    ALKPHOS 70 11/19/2016    AST 22 11/19/2016    ALT 14 11/19/2016     IMPRESSION/RECOMMENDATIONS:      68 yo woman with hx of ESRD on dialysis presents with a R MCA syndrome.    -->tpa and we will emergently transport to angio suite for mechanical thrombectomy.

## 2017-10-12 ENCOUNTER — APPOINTMENT (OUTPATIENT)
Dept: GENERAL RADIOLOGY | Age: 72
DRG: 023 | End: 2017-10-12
Payer: COMMERCIAL

## 2017-10-12 ENCOUNTER — APPOINTMENT (OUTPATIENT)
Dept: CT IMAGING | Age: 72
DRG: 023 | End: 2017-10-12
Payer: COMMERCIAL

## 2017-10-12 PROBLEM — R53.1 LEFT-SIDED WEAKNESS: Status: ACTIVE | Noted: 2017-10-11

## 2017-10-12 LAB
-: ABNORMAL
ABSOLUTE EOS #: 0 K/UL (ref 0–0.4)
ABSOLUTE LYMPH #: 1.73 K/UL (ref 1–4.8)
ABSOLUTE MONO #: 1.38 K/UL (ref 0.1–0.8)
AMORPHOUS: ABNORMAL
ANION GAP SERPL CALCULATED.3IONS-SCNC: 20 MMOL/L (ref 9–17)
BACTERIA: ABNORMAL
BASOPHILS # BLD: 0 %
BASOPHILS ABSOLUTE: 0 K/UL (ref 0–0.2)
BILIRUBIN URINE: NEGATIVE
BUN BLDV-MCNC: 54 MG/DL (ref 8–23)
BUN/CREAT BLD: ABNORMAL (ref 9–20)
CALCIUM SERPL-MCNC: 7.9 MG/DL (ref 8.6–10.4)
CASTS UA: ABNORMAL /LPF (ref 0–8)
CHLORIDE BLD-SCNC: 100 MMOL/L (ref 98–107)
CHOLESTEROL/HDL RATIO: 2.4
CHOLESTEROL: 122 MG/DL
CO2: 17 MMOL/L (ref 20–31)
COLOR: YELLOW
COMMENT UA: ABNORMAL
CREAT SERPL-MCNC: 2.29 MG/DL (ref 0.5–0.9)
CRYSTALS, UA: ABNORMAL /HPF
DIFFERENTIAL TYPE: ABNORMAL
EKG ATRIAL RATE: 110 BPM
EKG P AXIS: 46 DEGREES
EKG P-R INTERVAL: 172 MS
EKG Q-T INTERVAL: 322 MS
EKG QRS DURATION: 82 MS
EKG QTC CALCULATION (BAZETT): 435 MS
EKG R AXIS: -21 DEGREES
EKG T AXIS: 23 DEGREES
EKG VENTRICULAR RATE: 110 BPM
EOSINOPHILS RELATIVE PERCENT: 0 %
EPITHELIAL CELLS UA: ABNORMAL /HPF (ref 0–5)
ESTIMATED AVERAGE GLUCOSE: 94 MG/DL
GFR AFRICAN AMERICAN: 25 ML/MIN
GFR NON-AFRICAN AMERICAN: 21 ML/MIN
GFR SERPL CREATININE-BSD FRML MDRD: ABNORMAL ML/MIN/{1.73_M2}
GFR SERPL CREATININE-BSD FRML MDRD: ABNORMAL ML/MIN/{1.73_M2}
GLUCOSE BLD-MCNC: 148 MG/DL (ref 65–105)
GLUCOSE BLD-MCNC: 179 MG/DL (ref 65–105)
GLUCOSE BLD-MCNC: 211 MG/DL (ref 70–99)
GLUCOSE URINE: NEGATIVE
HBA1C MFR BLD: 4.9 % (ref 4–6)
HCT VFR BLD CALC: 29.9 % (ref 36–46)
HCT VFR BLD CALC: 30.3 % (ref 36–46)
HCT VFR BLD CALC: 32.9 % (ref 36–46)
HCT VFR BLD CALC: 35.8 % (ref 36–46)
HDLC SERPL-MCNC: 51 MG/DL
HEMOGLOBIN: 10.1 G/DL (ref 12–16)
HEMOGLOBIN: 10.1 G/DL (ref 12–16)
HEMOGLOBIN: 10.8 G/DL (ref 12–16)
HEMOGLOBIN: 12.3 G/DL (ref 12–16)
KETONES, URINE: NEGATIVE
LACTIC ACID, WHOLE BLOOD: 2.9 MMOL/L (ref 0.7–2.1)
LACTIC ACID, WHOLE BLOOD: 4.8 MMOL/L (ref 0.7–2.1)
LDL CHOLESTEROL: 53 MG/DL (ref 0–130)
LEUKOCYTE ESTERASE, URINE: ABNORMAL
LYMPHOCYTES # BLD: 5 %
MCH RBC QN AUTO: 31 PG (ref 26–34)
MCHC RBC AUTO-ENTMCNC: 34.3 G/DL (ref 31–37)
MCV RBC AUTO: 90.3 FL (ref 80–100)
MONOCYTES # BLD: 4 %
MORPHOLOGY: NORMAL
MRSA, DNA, NASAL: NORMAL
MUCUS: ABNORMAL
NITRITE, URINE: NEGATIVE
OTHER OBSERVATIONS UA: ABNORMAL
PDW BLD-RTO: 14.7 % (ref 12.5–15.4)
PH UA: 5 (ref 5–8)
PLATELET # BLD: 182 K/UL (ref 140–450)
PLATELET ESTIMATE: ABNORMAL
PMV BLD AUTO: 8.6 FL (ref 6–12)
POTASSIUM SERPL-SCNC: 4.1 MMOL/L (ref 3.7–5.3)
PROCALCITONIN: 2.47 NG/ML
PROTEIN UA: ABNORMAL
RBC # BLD: 3.96 M/UL (ref 4–5.2)
RBC # BLD: ABNORMAL 10*6/UL
RBC UA: ABNORMAL /HPF (ref 0–4)
RENAL EPITHELIAL, UA: ABNORMAL /HPF
SEG NEUTROPHILS: 91 %
SEGMENTED NEUTROPHILS ABSOLUTE COUNT: 31.39 K/UL (ref 1.8–7.7)
SODIUM BLD-SCNC: 137 MMOL/L (ref 135–144)
SPECIFIC GRAVITY UA: 1.06 (ref 1–1.03)
SPECIMEN DESCRIPTION: NORMAL
TRICHOMONAS: ABNORMAL
TRIGL SERPL-MCNC: 88 MG/DL
TSH SERPL DL<=0.05 MIU/L-ACNC: 1.36 MIU/L (ref 0.3–5)
TURBIDITY: ABNORMAL
URINE HGB: ABNORMAL
UROBILINOGEN, URINE: NORMAL
VLDLC SERPL CALC-MCNC: NORMAL MG/DL (ref 1–30)
WBC # BLD: 34.5 K/UL (ref 3.5–11)
WBC # BLD: ABNORMAL 10*3/UL
WBC UA: ABNORMAL /HPF (ref 0–5)
YEAST: ABNORMAL

## 2017-10-12 PROCEDURE — 87086 URINE CULTURE/COLONY COUNT: CPT

## 2017-10-12 PROCEDURE — 2580000003 HC RX 258: Performed by: NEUROLOGICAL SURGERY

## 2017-10-12 PROCEDURE — 99222 1ST HOSP IP/OBS MODERATE 55: CPT | Performed by: INTERNAL MEDICINE

## 2017-10-12 PROCEDURE — 74000 XR ABDOMEN LIMITED (KUB): CPT

## 2017-10-12 PROCEDURE — 80061 LIPID PANEL: CPT

## 2017-10-12 PROCEDURE — 99233 SBSQ HOSP IP/OBS HIGH 50: CPT | Performed by: PSYCHIATRY & NEUROLOGY

## 2017-10-12 PROCEDURE — 85014 HEMATOCRIT: CPT

## 2017-10-12 PROCEDURE — 2580000003 HC RX 258: Performed by: EMERGENCY MEDICINE

## 2017-10-12 PROCEDURE — 80048 BASIC METABOLIC PNL TOTAL CA: CPT

## 2017-10-12 PROCEDURE — 2500000003 HC RX 250 WO HCPCS: Performed by: INTERNAL MEDICINE

## 2017-10-12 PROCEDURE — 6370000000 HC RX 637 (ALT 250 FOR IP): Performed by: INTERNAL MEDICINE

## 2017-10-12 PROCEDURE — 94762 N-INVAS EAR/PLS OXIMTRY CONT: CPT

## 2017-10-12 PROCEDURE — 71010 XR CHEST PORTABLE: CPT

## 2017-10-12 PROCEDURE — 6360000002 HC RX W HCPCS: Performed by: NEUROLOGICAL SURGERY

## 2017-10-12 PROCEDURE — 36556 INSERT NON-TUNNEL CV CATH: CPT | Performed by: PSYCHIATRY & NEUROLOGY

## 2017-10-12 PROCEDURE — 84145 PROCALCITONIN (PCT): CPT

## 2017-10-12 PROCEDURE — 36415 COLL VENOUS BLD VENIPUNCTURE: CPT

## 2017-10-12 PROCEDURE — 36556 INSERT NON-TUNNEL CV CATH: CPT

## 2017-10-12 PROCEDURE — 82947 ASSAY GLUCOSE BLOOD QUANT: CPT

## 2017-10-12 PROCEDURE — 81001 URINALYSIS AUTO W/SCOPE: CPT

## 2017-10-12 PROCEDURE — 83036 HEMOGLOBIN GLYCOSYLATED A1C: CPT

## 2017-10-12 PROCEDURE — 2000000003 HC NEURO ICU R&B

## 2017-10-12 PROCEDURE — 02H633Z INSERTION OF INFUSION DEVICE INTO RIGHT ATRIUM, PERCUTANEOUS APPROACH: ICD-10-PCS | Performed by: PSYCHIATRY & NEUROLOGY

## 2017-10-12 PROCEDURE — 85018 HEMOGLOBIN: CPT

## 2017-10-12 PROCEDURE — 83605 ASSAY OF LACTIC ACID: CPT

## 2017-10-12 PROCEDURE — 85025 COMPLETE CBC W/AUTO DIFF WBC: CPT

## 2017-10-12 PROCEDURE — 99223 1ST HOSP IP/OBS HIGH 75: CPT | Performed by: PSYCHIATRY & NEUROLOGY

## 2017-10-12 PROCEDURE — 6360000002 HC RX W HCPCS: Performed by: EMERGENCY MEDICINE

## 2017-10-12 PROCEDURE — 70450 CT HEAD/BRAIN W/O DYE: CPT

## 2017-10-12 PROCEDURE — 87040 BLOOD CULTURE FOR BACTERIA: CPT

## 2017-10-12 PROCEDURE — 84443 ASSAY THYROID STIM HORMONE: CPT

## 2017-10-12 PROCEDURE — 87186 SC STD MICRODIL/AGAR DIL: CPT

## 2017-10-12 PROCEDURE — 87077 CULTURE AEROBIC IDENTIFY: CPT

## 2017-10-12 RX ORDER — CEFTRIAXONE SODIUM 1 G/50ML
1 INJECTION, SOLUTION INTRAVENOUS EVERY 24 HOURS
Status: DISCONTINUED | OUTPATIENT
Start: 2017-10-12 | End: 2017-10-18

## 2017-10-12 RX ORDER — 0.9 % SODIUM CHLORIDE 0.9 %
2000 INTRAVENOUS SOLUTION INTRAVENOUS ONCE
Status: COMPLETED | OUTPATIENT
Start: 2017-10-12 | End: 2017-10-12

## 2017-10-12 RX ORDER — DEXTROSE MONOHYDRATE 25 G/50ML
12.5 INJECTION, SOLUTION INTRAVENOUS PRN
Status: DISCONTINUED | OUTPATIENT
Start: 2017-10-12 | End: 2017-10-18

## 2017-10-12 RX ORDER — SODIUM CHLORIDE 9 MG/ML
INJECTION, SOLUTION INTRAVENOUS CONTINUOUS
Status: DISCONTINUED | OUTPATIENT
Start: 2017-10-12 | End: 2017-10-14

## 2017-10-12 RX ORDER — NICOTINE POLACRILEX 4 MG
15 LOZENGE BUCCAL PRN
Status: DISCONTINUED | OUTPATIENT
Start: 2017-10-12 | End: 2017-10-18

## 2017-10-12 RX ORDER — CEFAZOLIN SODIUM 2 G/100ML
2 INJECTION, SOLUTION INTRAVENOUS EVERY 8 HOURS
Status: DISCONTINUED | OUTPATIENT
Start: 2017-10-12 | End: 2017-10-12

## 2017-10-12 RX ORDER — FENTANYL CITRATE 50 UG/ML
50 INJECTION, SOLUTION INTRAMUSCULAR; INTRAVENOUS
Status: DISPENSED | OUTPATIENT
Start: 2017-10-12 | End: 2017-10-12

## 2017-10-12 RX ORDER — METOPROLOL TARTRATE 5 MG/5ML
5 INJECTION INTRAVENOUS EVERY 6 HOURS
Status: DISCONTINUED | OUTPATIENT
Start: 2017-10-12 | End: 2017-10-18

## 2017-10-12 RX ORDER — ASPIRIN 81 MG/1
81 TABLET, CHEWABLE ORAL ONCE
Status: DISCONTINUED | OUTPATIENT
Start: 2017-10-12 | End: 2017-10-18

## 2017-10-12 RX ORDER — DEXTROSE MONOHYDRATE 50 MG/ML
100 INJECTION, SOLUTION INTRAVENOUS PRN
Status: DISCONTINUED | OUTPATIENT
Start: 2017-10-12 | End: 2017-10-18

## 2017-10-12 RX ADMIN — SODIUM CHLORIDE 2000 ML: 9 INJECTION, SOLUTION INTRAVENOUS at 15:34

## 2017-10-12 RX ADMIN — SODIUM CHLORIDE: 9 INJECTION, SOLUTION INTRAVENOUS at 12:27

## 2017-10-12 RX ADMIN — PHENYLEPHRINE HYDROCHLORIDE 75 MCG/MIN: 10 INJECTION INTRAVENOUS at 15:48

## 2017-10-12 RX ADMIN — PHENYLEPHRINE HYDROCHLORIDE 200 MCG/MIN: 10 INJECTION INTRAVENOUS at 00:23

## 2017-10-12 RX ADMIN — METOPROLOL TARTRATE 5 MG: 5 INJECTION INTRAVENOUS at 20:15

## 2017-10-12 RX ADMIN — Medication 10 ML: at 20:16

## 2017-10-12 RX ADMIN — CEFTRIAXONE SODIUM 1 G: 1 INJECTION, SOLUTION INTRAVENOUS at 19:45

## 2017-10-12 RX ADMIN — INSULIN LISPRO 2 UNITS: 100 INJECTION, SOLUTION INTRAVENOUS; SUBCUTANEOUS at 10:48

## 2017-10-12 NOTE — CONSULTS
WYL:282   ; Diastolic (78YIQ), ICY:12, Min:15, Max:142      Continuous infusions:    dextrose      phenylephrine (OSITO-SYNEPHRINE) 50mg/250mL infusion 200 mcg/min (10/12/17 0023)       ON EXAMINATION:  GENERAL  Appears comfortable and in no distress   HEENT  NC/ AT   NECK  Supple and no bruits heard   MENTAL STATUS:  Alert, oriented, intact memory, no confusion, normal speech, normal language, no hallucination or delusion   CRANIAL NERVES: II     -       Pupils reactive b/l.  Blinks to threat bilaterally  III,IV,VI -  Rt gaze preference, no afferent defect, no ptosis  V     -     Normal facial sensation  VII    -     Left lower facial weakness  VIII   -     Intact hearing  IX,X -     Symmetrical palate  XI    -     Symmetrical shoulder shrug  XII   -     Midline tongue, no atrophy    MOTOR FUNCTION:  significant for 0/5 in Lt UE and 4/5 in Lt LE and 4/5 in Rt UE and Rt LE with normal bulk, normal tone, normal power;  no involuntary movements, no tremor   SENSORY FUNCTION:  withdraws to pp rt > left    CEREBELLAR FUNCTION:  could not be assessed   REFLEX FUNCTION:  Symmetric, no perverted reflex, bilateral extensor plantars   STATION and GAIT  Not tested         Data:    Lab Results:     Lab Results   Component Value Date    CHOL 122 10/12/2017    LDLCHOLESTEROL 53 10/12/2017    HDL 51 10/12/2017    TRIG 88 10/12/2017    ALT 14 11/19/2016    AST 22 11/19/2016    TSH 0.04 (L) 11/19/2016    INR 0.9 10/11/2017    LABA1C 5.4 03/27/2014    LABMICR 204 03/29/2014    GWUKBHCL15 2928 (H) 03/28/2014     Hematology:  Recent Labs      10/11/17   1605  10/11/17   2327  10/12/17   0618   WBC  6.9  22.3*  34.5*   HGB  14.6  12.0  12.3   HCT  43.8  35.9*  35.8*   PLT  119*  196  182   INR  0.9   --    --      Chemistry:  Recent Labs      10/11/17   1602  10/11/17   1605  10/12/17   0618   NA   --   138  137   K   --   3.9  4.1   CL   --   97*  100   CO2   --   24  17*   GLUCOSE   --   91  211*   BUN   --   49*  54*   CREATININE

## 2017-10-12 NOTE — PROGRESS NOTES
4:30 AM: CT head reviewed showing newly developed large right MCA distribution acute infarct. No neurologic changes since assessment post attempted thrombectomy. Dr. Teresa Choi updated.

## 2017-10-12 NOTE — FLOWSHEET NOTE
Stopped by to visit patient. When arrived, pt was unresponsive but  talked with the sister-in-law Maida Davis.  explained that spiritual care was available anytime if needed. Maida Davis was glad to hear that.  offered prayer and Maida Davis accepted. Maida Davis was thankful the  stopped by.

## 2017-10-12 NOTE — PROGRESS NOTES
Resident Progress Note  Date: 10/12/2017  Time: 2:00 PM  Patient Name: Krystle Lema  Patient : 1945  Room/Bed: [facility]5956-85  Allergies: Allergies   Allergen Reactions    Other      disolvable sutures cause infection       Interval History:  Patient presents to the emergency department on 10/11 after falling out of her wheelchair and was unable to get herself up. EMS noted the patient had asymmetric smile with left facial droop. Last known well around 3:50 PM. Patient evaluated by stroke team in the emergency department and had left hemiparesis and right gaze preference with an NIH around 10. Patient found to have right MCA occlusion. tPA was started around 5:15 PM and patient was taken for thrombectomy. Patient admitted to neuro ICU after unsuccessful attempted M1 MCA thrombectomy. Patient with past medical history of hypertension, hepatitis C, ESRD on dialysis, and morbid obesity. No acute events overnight.  Patient verbal but not oriented or alert    Current Medications:  Scheduled Meds:   metoprolol  5 mg Intravenous Q6H    insulin lispro  0-6 Units Subcutaneous TID WC    insulin lispro  0-3 Units Subcutaneous Nightly    aspirin  81 mg Oral Once    sodium chloride  2,000 mL Intravenous Once    sodium chloride flush  10 mL Intravenous 2 times per day    sodium chloride flush  10 mL Intravenous 2 times per day    sodium chloride flush  10 mL Intravenous 2 times per day    atorvastatin  40 mg Oral Nightly    0.9 % sodium chloride  250 mL Intravenous Once    sodium chloride  250 mL Intravenous Once       Continuous Infusions:   dextrose      sodium chloride 100 mL/hr at 10/12/17 1227    phenylephrine (OSITO-SYNEPHRINE) 50mg/250mL infusion 200 mcg/min (10/12/17 0023)       Vitals:  Patient Vitals for the past 8 hrs:   BP Temp Temp src Pulse Resp SpO2   10/12/17 1133 (!) 142/91 - - 129 - -   10/12/17 1118 118/75 - - 128 - -   10/12/17 1103 (!) 153/60 - - 129 - 97 %   10/12/17 1048 (!) 31 - 37 g/dL    RDW 14.7 12.5 - 15.4 %    Platelets 545 899 - 045 k/uL    MPV 8.6 6.0 - 12.0 fL    Differential Type NOT REPORTED     WBC Morphology NOT REPORTED     RBC Morphology NOT REPORTED     Platelet Estimate NOT REPORTED     Seg Neutrophils 91 %    Lymphocytes 5 %    Monocytes 4 %    Eosinophils % 0 %    Basophils 0 %    Segs Absolute 31.39 (H) 1.8 - 7.7 k/uL    Absolute Lymph # 1.73 1.0 - 4.8 k/uL    Absolute Mono # 1.38 (H) 0.1 - 0.8 k/uL    Absolute Eos # 0.00 0.0 - 0.4 k/uL    Basophils # 0.00 0.0 - 0.2 k/uL    Morphology Normal    BASIC METABOLIC PANEL    Collection Time: 10/12/17  6:18 AM   Result Value Ref Range    Glucose 211 (H) 70 - 99 mg/dL    BUN 54 (H) 8 - 23 mg/dL    CREATININE 2.29 (H) 0.50 - 0.90 mg/dL    Bun/Cre Ratio NOT REPORTED 9 - 20    Calcium 7.9 (L) 8.6 - 10.4 mg/dL    Sodium 137 135 - 144 mmol/L    Potassium 4.1 3.7 - 5.3 mmol/L    Chloride 100 98 - 107 mmol/L    CO2 17 (L) 20 - 31 mmol/L    Anion Gap 20 (H) 9 - 17 mmol/L    GFR Non-African American 21 (L) >60 mL/min    GFR  25 (L) >60 mL/min    GFR Comment          GFR Staging NOT REPORTED    Lipid panel - fasting    Collection Time: 10/12/17  6:18 AM   Result Value Ref Range    Cholesterol 122 <200 mg/dL    HDL 51 >40 mg/dL    LDL Cholesterol 53 0 - 130 mg/dL    Chol/HDL Ratio 2.4 <5    Triglycerides 88 <150 mg/dL    VLDL NOT REPORTED 1 - 30 mg/dL   Hemoglobin A1C    Collection Time: 10/12/17  6:18 AM   Result Value Ref Range    Hemoglobin A1C 4.9 4.0 - 6.0 %    Estimated Avg Glucose 94 mg/dL   TSH with Reflex    Collection Time: 10/12/17  6:18 AM   Result Value Ref Range    TSH 1.36 0.30 - 5.00 mIU/L   LACTIC ACID, WHOLE BLOOD    Collection Time: 10/12/17 10:12 AM   Result Value Ref Range    Lactic Acid, Whole Blood 2.9 (H) 0.7 - 2.1 mmol/L   UA W/REFLEX CULTURE    Collection Time: 10/12/17 12:38 PM   Result Value Ref Range    Color, UA YELLOW YEL    Turbidity UA CLOUDY (A) CLEAR    Glucose, Ur NEGATIVE NEG Bilirubin Urine NEGATIVE NEG    Ketones, Urine NEGATIVE NEG    Specific Gravity, UA 1.063 (H) 1.005 - 1.030    Urine Hgb SMALL (A) NEG    pH, UA 5.0 5.0 - 8.0    Protein, UA TRACE (A) NEG    Urobilinogen, Urine Normal NORM    Nitrite, Urine NEGATIVE NEG    Leukocyte Esterase, Urine MODERATE (A) NEG    Urinalysis Comments NOT REPORTED    Microscopic Urinalysis    Collection Time: 10/12/17 12:38 PM   Result Value Ref Range    -          WBC, UA TOO NUMEROUS TO COUNT 0 - 5 /HPF    RBC, UA 2 TO 5 0 - 4 /HPF    Casts UA 5 TO 10 HYALINE 0 - 8 /LPF    Crystals UA NOT REPORTED NONE /HPF    Epithelial Cells UA 0 TO 2 0 - 5 /HPF    Renal Epithelial, Urine NOT REPORTED 0 /HPF    Bacteria, UA FEW (A) NONE    Mucus, UA NOT REPORTED NONE    Trichomonas, UA NOT REPORTED NONE    Amorphous, UA NOT REPORTED NONE    Other Observations UA NOT REPORTED NREQ    Yeast, UA NOT REPORTED NONE       RADIOLOGY:  Ct Head Wo Contrast    Result Date: 10/12/2017  EXAMINATION: CT OF THE HEAD WITHOUT CONTRAST  10/12/2017 1:59 am TECHNIQUE: CT of the head was performed without the administration of intravenous contrast. Dose modulation, iterative reconstruction, and/or weight based adjustment of the mA/kV was utilized to reduce the radiation dose to as low as reasonably achievable. COMPARISON: 10/11/2017, 9 hours prior HISTORY: ORDERING SYSTEM PROVIDED HISTORY: post thrombectomy, angioplasty FINDINGS: BRAIN/VENTRICLES: Again seen is a hyperdense focus in the right M1 segment middle cerebral artery. There is contrast enhancement of right greater than left MCA distribution vasculature from previous study. There is interval development of loss of gray-white differentiation and sulcal effacement in the right MCA distribution, large area. There is minimal mass effect upon the ventricular system. There is no midline shift or downward herniation pattern. There is no evidence of hydrocephalus.  ORBITS: The visualized portion of the orbits demonstrate no subclavian arteries. There is calcified plaque along the arch and great vessel origins but no significant stenosis. Prominent great vessels are somewhat tortuous. The left vertebral artery originates from the arch just proximal to the left subclavian artery. CAROTID ARTERIES: The common carotid arteries are normal in appearance without evidence of a flow limiting stenosis. The internal carotid arteries are normal in appearance without evidence of a flow limiting stenosis by NASCET criteria. No dissection or arterial injury is seen. Distal cervical internal carotid arteries are tortuous bilaterally. There is small amount of plaque in the carotid bulbs, left greater than right, but no significant stenosis. VERTEBRAL ARTERIES: The left vertebral origin appears to be at the arch just proximal to the subclavian artery. There is local calcified plaque causing possible moderate stenosis. The right vertebral artery appears normal. SOFT TISSUES:  The lung apices are clear. No cervical or superior mediastinal lymphadenopathy. The visualized portion of the larynx and pharynx appear unremarkable. The parotid, submandibular and thyroid glands demonstrate no acute abnormality. There is an 8 mm calcified lesion in the right lobe of the thyroid. BONES: The visualized osseous structures appear unremarkable. Possible moderate stenosis at the origin of the left vertebral artery at the arch of the aorta. Mild plaque in the carotid bulbs, left greater than right, with no significant stenosis. RECOMMENDATIONS: Managing Incidental Thyroid Nodule Detected at CT or MRI or US 1. Further evaluation by thyroid Ultrasound recommended for these incidental nodules: Patient Age 25 years or less - Any nodule. Patient Age 21-27 years old - Nodule 1 cm in size or greater Patient Age 28 years or more - Nodule 1.5 cm in size or greater 2.  Follow up thyroid ultrasound also recommend in these scenarios -Solitary nodule with high risk 8 mm in length. The M2 and M3 branches appear in normal with increased collateral flow noted on venous and delayed imaging. POSTERIOR CIRCULATION: The posterior cerebral arteries demonstrate no focal stenosis. The vertebral and basilar arteries appear unremarkable. There is a persistent fetal origin of the right posterior cerebral artery. ANEURYSM: No intracranial aneurysm is seen. BRAIN: No mass effect or midline shift. No abnormal extra-axial fluid collection. The gray-white differentiation appears grossly maintained. Extensive is hypertrophic frontalis interna noted. In particular there is a large left parietal exostosis measuring 18 x 60 mm in AP and transverse dimensions. This could also conceivably represent a calcified meningioma. Complete or near complete occlusion 8 mm segment distal M1 segment right middle cerebral artery territory. Case discussed with Dr. Meg Samayoa the neuro interventionalist at 5:30 p.m.. Sensitivity is limited without post processed and 3D imaging. An addendum will be performed when these are available. Labs and imaging reviewed with Dr. Shayan Andres:  Gen: morbid morbid obesity, verbal but not alert or oriented, sporadically answers questions  CV: RRR  Resp: CTAB  Abd: soft, non-distended  Skin: Cap refill <2 seconds    NEURO EXAM:  Alert and Oriented x 0, verbal, does say \"ow\" to painful stimuli, answer some questions  Follows Commands  decreased strength LUE, LLE withdraws to pain, strength 5/5 in RLE and RUE. NUTRITION:   npo    DRAINS:   There are no drains to monitor at this time. DVT PROPHYLAXIS:  SCD sleeves  Thigh High ROGELIO Stockings  No anticoagulation at this time.        PLAN:  NEURO:  R MCA occlusion stroke  POD #1 s/p unsuccessful attempted right M1 MCA thrombectomy  Post tPA- given approximately 5:15 PM  -180   Lipitor 40 mg daily  Post tPA protocol  Head CT in AM an 24 hours post tPA  Neuro checks per protocol  Neuroendovascular

## 2017-10-12 NOTE — CONSULTS
Ox: SpO2: 97 %  24HR Pulse Ox Range: SpO2  Av.3 %  Min: 92 %  Max: 100 %  Patient Vitals for the past 12 hrs:   BP Temp Temp src Pulse Resp SpO2 Height Weight   10/11/17 1719 (!) 149/79 - - 106 - 97 % - -   10/11/17 1717 - - - 102 - 94 % - -   10/11/17 1713 - - - 109 - - - -   10/11/17 1632 (!) 139/111 - - 100 - 100 % - -   10/11/17 1628 - 98.1 °F (36.7 °C) Oral - - - - -   10/11/17 1617 (!) 146/127 - - 109 - 100 % - -   10/11/17 1558 (!) 149/109 - - 107 18 99 % 5' 5\" (1.651 m) 260 lb (117.9 kg)     Estimated body mass index is 43.27 kg/m² as calculated from the following:    Height as of this encounter: 5' 5\" (1.651 m). Weight as of this encounter: 260 lb (117.9 kg).  []<16 Severe malnutrition  []1616.99 Moderate malnutrition  []1718.49 Mild malnutrition  []18.524.9 Normal  []2529.9 Overweight (not obese)  []3034.9 Obese class 1 (Low Risk)  []3539.9 Obese class 2 (Moderate Risk)  [x]? 40 Obese class 3 (High Risk)    RECENT LABS: Lab Results   Component Value Date    WBC 6.9 10/11/2017    HGB 14.6 10/11/2017    HCT 43.8 10/11/2017     (L) 10/11/2017    CHOL 133 2014    TRIG 65 2014    HDL 63 2014    ALT 14 2016    AST 22 2016     10/11/2017    K 3.9 10/11/2017    CL 97 (L) 10/11/2017    CREATININE 1.80 (H) 10/11/2017    BUN 49 (H) 10/11/2017    CO2 24 10/11/2017    TSH 0.04 (L) 2016    INR 0.9 10/11/2017    LABA1C 5.4 2014    LABMICR 204 2014     24 HOUR INTAKE/OUTPUT:  Intake/Output Summary (Last 24 hours) at 10/11/17 1310  Last data filed at 10/11/17 2231   Gross per 24 hour   Intake             1700 ml   Output              500 ml   Net             1200 ml     RADIOLOGY:   Ct Head Wo Contrast    Result Date: 10/11/2017  EXAMINATION: CT OF THE HEAD WITHOUT CONTRAST  10/11/2017 5:01 pm TECHNIQUE: CT of the head was performed without the administration of intravenous contrast. Dose modulation, iterative reconstruction, and/or weight based significant stenosis is seen of the innominate artery or subclavian arteries. There is calcified plaque along the arch and great vessel origins but no significant stenosis. Prominent great vessels are somewhat tortuous. The left vertebral artery originates from the arch just proximal to the left subclavian artery. CAROTID ARTERIES: The common carotid arteries are normal in appearance without evidence of a flow limiting stenosis. The internal carotid arteries are normal in appearance without evidence of a flow limiting stenosis by NASCET criteria. No dissection or arterial injury is seen. Distal cervical internal carotid arteries are tortuous bilaterally. There is small amount of plaque in the carotid bulbs, left greater than right, but no significant stenosis. VERTEBRAL ARTERIES: The left vertebral origin appears to be at the arch just proximal to the subclavian artery. There is local calcified plaque causing possible moderate stenosis. The right vertebral artery appears normal. SOFT TISSUES:  The lung apices are clear. No cervical or superior mediastinal lymphadenopathy. The visualized portion of the larynx and pharynx appear unremarkable. The parotid, submandibular and thyroid glands demonstrate no acute abnormality. There is an 8 mm calcified lesion in the right lobe of the thyroid. BONES: The visualized osseous structures appear unremarkable. Possible moderate stenosis at the origin of the left vertebral artery at the arch of the aorta. Mild plaque in the carotid bulbs, left greater than right, with no significant stenosis. RECOMMENDATIONS: Managing Incidental Thyroid Nodule Detected at CT or MRI or US 1. Further evaluation by thyroid Ultrasound recommended for these incidental nodules: Patient Age 25 years or less - Any nodule. Patient Age 21-27 years old - Nodule 1 cm in size or greater Patient Age 28 years or more - Nodule 1.5 cm in size or greater 2.  Follow up thyroid ultrasound also recommend of the right middle cerebral artery approximately 8 mm in length. The M2 and M3 branches appear in normal with increased collateral flow noted on venous and delayed imaging. POSTERIOR CIRCULATION: The posterior cerebral arteries demonstrate no focal stenosis. The vertebral and basilar arteries appear unremarkable. There is a persistent fetal origin of the right posterior cerebral artery. ANEURYSM: No intracranial aneurysm is seen. BRAIN: No mass effect or midline shift. No abnormal extra-axial fluid collection. The gray-white differentiation appears grossly maintained. Extensive is hypertrophic frontalis interna noted. In particular there is a large left parietal exostosis measuring 18 x 60 mm in AP and transverse dimensions. This could also conceivably represent a calcified meningioma. Complete or near complete occlusion 8 mm segment distal M1 segment right middle cerebral artery territory. Case discussed with Dr. Domonique Greene the neuro interventionalist at 5:30 p.m.. Sensitivity is limited without post processed and 3D imaging. An addendum will be performed when these are available. PHYSICAL EXAMINATION:   General appearance - alert, awake, follows commands  Eyes - PERRL, right sided gaze  Head: Left sided facial droop  Chest - clear to auscultation, no wheezes, rales or rhonchi, symmetric air entry  Heart - normal rate, regular rhythm, normal S1, S2, no murmurs, rubs, clicks or gallops  Abdomen - soft, nontender, nondistended, no masses or organomegaly  Neurological - Follows commands, R gaze, L facial droop, decreased strength LUE, LLE withdraws to pain, strength 5/5 in RLE and RUE. Extremities - ecchymosis and hematom RUE      NUTRITION:  Diet NPO Effective Now      DVT PROPHYLAXIS:  [x] SCD sleeves - Thigh High   [] ROGELIO stockings - Thigh High  [] No chemoprophylaxis anticoagulation at this time. [] OK for primary team to initiate chemoprophylaxis at this time.    []Lovenox  []Heparin

## 2017-10-12 NOTE — CARE COORDINATION
yes    Expected Discharge date:  TBD  Follow Up Appointment: Best Day/ Time: TBD    Transportation provider: Possible ambulette/ambulance  Transportation arrangements needed for discharge: Yes    Discharge Plan: Home vs. SNF        Electronically signed by STAR Tracey on 10/12/17 at 2:11 PM

## 2017-10-12 NOTE — PROGRESS NOTES
Intensive Care Unit  Endovascular Neurosurgery  Fellow Progress Note      SUBJECTIVE:    Jihan Nuñez is a 67 y.o. female presented with L hemiparesis and right gaze preference on 10/11/17 and was found to have R MCA occlusion for which she underwent attempt of thrombectomy which was not successful. She denies any new complains. OBJECTIVE:    Current Medications:  Current Facility-Administered Medications: sodium chloride flush 0.9 % injection 10 mL, 10 mL, Intravenous, 2 times per day  sodium chloride flush 0.9 % injection 10 mL, 10 mL, Intravenous, PRN  sodium chloride flush 0.9 % injection 10 mL, 10 mL, Intravenous, 2 times per day  sodium chloride flush 0.9 % injection 10 mL, 10 mL, Intravenous, PRN  sodium chloride flush 0.9 % injection 10 mL, 10 mL, Intravenous, 2 times per day  sodium chloride flush 0.9 % injection 10 mL, 10 mL, Intravenous, PRN  acetaminophen (TYLENOL) tablet 650 mg, 650 mg, Oral, Q4H PRN  magnesium hydroxide (MILK OF MAGNESIA) 400 MG/5ML suspension 30 mL, 30 mL, Oral, Daily PRN  ondansetron (ZOFRAN) injection 4 mg, 4 mg, Intravenous, Q6H PRN  atorvastatin (LIPITOR) tablet 40 mg, 40 mg, Oral, Nightly  0.9 % sodium chloride infusion 250 mL, 250 mL, Intravenous, Once  0.9 % sodium chloride bolus, 250 mL, Intravenous, Once  phenylephrine (OSITO-SYNEPHRINE) 50 mg in sodium chloride 0.9 % 250 mL infusion, 100 mcg/min, Intravenous, Continuous    Physical:   VITALS:  BP (!) 144/83   Pulse 120   Temp 98.3 °F (36.8 °C) (Oral)   Resp 18   Ht 5' 5\" (1.651 m)   Wt 260 lb (117.9 kg)   LMP  (LMP Unknown) Comment: post menopausal  SpO2 100%   BMI 43.27 kg/m²     On exam today:   NEUROLOGIC:    Alert and oriented in all spheres  CN: II-XII significant for R gaze preference, L lower facial droop  Motor/sensory: LUE 0/5, LLE 2/5  Right hemibody antigravity and purposeful. Lungs sounds clear to auscultation bilaterally. Heart exam: normal S1,S2 sounds,RRR,no murmers.  Abdomen was soft, NT,ND

## 2017-10-12 NOTE — PROGRESS NOTES
the last 72 hours. Lactic Acid: No results for input(s): LACTA in the last 72 hours. CARDIAC ENZYMES:  Recent Labs      10/11/17   1603  10/11/17   1605   CKTOTAL   --   61   CKMB   --   1.3   TROPONINI  0.00   --      BNP: No results for input(s): BNP in the last 72 hours.   Lipids: Recent Labs      10/12/17   0618   CHOL  122   TRIG  88   HDL  51     ABGs: No results found for: PH, PCO2, PO2, HCO3, O2SAT  Thyroid:   Lab Results   Component Value Date    TSH 0.04 11/19/2016      Urinalysis: Color, UA   Date Value Ref Range Status   09/30/2017 YELLOW YEL Final     pH, UA   Date Value Ref Range Status   09/30/2017 7.5 5.0 - 8.0 Final     Specific Gravity, UA   Date Value Ref Range Status   09/30/2017 1.010 1.005 - 1.030 Final     Protein, UA   Date Value Ref Range Status   09/30/2017 TRACE (A) NEG Final     RBC, UA   Date Value Ref Range Status   09/30/2017 10 TO 20 0 - 2 /HPF Final     Bacteria, UA   Date Value Ref Range Status   09/30/2017 MANY (A) NONE Final     Comment:     [address] [place] [address] Drive [address] [place] [address] [phone]     Nitrite, Urine   Date Value Ref Range Status   09/30/2017 NEGATIVE NEG Final     WBC, UA   Date Value Ref Range Status   09/30/2017 TOO NUMEROUS TO COUNT 0 - 5 /HPF Final     Leukocyte Esterase, Urine   Date Value Ref Range Status   09/30/2017 LARGE (A) NEG Final     Comment:     [address] [place] [address] Drive [address] [place] [address] [phone]     Yeast, UA   Date Value Ref Range Status   09/30/2017 NOT REPORTED NONE Final     Glucose, Ur   Date Value Ref Range Status   09/30/2017 NEGATIVE NEG Final     Bilirubin Urine   Date Value Ref Range Status   09/30/2017 NEGATIVE NEG Final           Assessment:  Principal Problem:    Cerebrovascular accident (CVA) due to thrombosis of right middle cerebral artery (HCC)  Active Problems:    Acute left hemiparesis (HCC)    HTN (hypertension)    Hep C w/o coma, chronic (HCC)    ESRD on hemodialysis ([facility] [address]    Morbid obesity

## 2017-10-12 NOTE — CONSULTS
negative  Allergy and Immunology ROS: negative  Hematological and Lymphatic ROS: negative, except for anemia of chr ds  Endocrine ROS: negative  Breast ROS: negative  Respiratory ROS: no cough, shortness of breath, or wheezing  Cardiovascular ROS: no chest pain or dyspnea on exertion  Gastrointestinal ROS:negative  Genito-Urinary ROS: negative  Musculoskeletal ROS: negative  Neurological ROS: as above  Dermatological ROS: negative    EXAMINATION       Vitals:    10/12/17 0803 10/12/17 0818 10/12/17 0833 10/12/17 0848   BP: (!) 165/76 (!) 150/84 (!) 154/77 (!) 162/85   Pulse: 126 125 124 125   Resp: 18      Temp: 97.5 °F (36.4 °C)      TempSrc: Oral      SpO2: 98% 99% 99% 99%   Weight:       Height:         24HR INTAKE/OUTPUT:    Intake/Output Summary (Last 24 hours) at 10/12/17 0931  Last data filed at 10/12/17 0748   Gross per 24 hour   Intake             3451 ml   Output              860 ml   Net             2591 ml       General appearance:Awake, alert, in no acute distress  Skin: warm and dry, no rash or erythema  Eyes: conjunctivae normal and sclera anicteric  ENT: no thrush no pharyngeal congestion  orodental hygiene   Neck: No JVD, Lymphadenopathty or thyromegaly  Respiratory: vesicular breath sounds,no wheeze/crackles  Cardiovascular: S1 S2 normal,no gallop or organic murmur. No carotid bruit  Abdomen:Non tender/non distended. Bowel sounds present  Extremities: No Cyanosis or Clubbing,Lower extremity edema. Left AV fistula is without palpable pulseand could not auscultate any bruit, with doppler faint bruit heard. Neurological:Alert and oriented to self. Left sided weakness 3-4/5, right gaze preference.   Speech is dysarthric    INVESTIGATIONS     PTH:  No results found for: PTH  abs:   CBC: Recent Labs      10/11/17   1605  10/11/17   2327  10/12/17   0618   WBC  6.9  22.3*  34.5*   RBC  4.68  3.93*  3.96*   HGB  14.6  12.0  12.3   HCT  43.8  35.9*  35.8*   MCV  93.5  91.2  90.3   MCH  31.3  30.6  31.0 assessment and plan and status of the problem list as documented.       .  Electronically signed by Keyla Horton MD on 10/12/2017 at 1:42 PM

## 2017-10-12 NOTE — PROGRESS NOTES
[address] [facility]  Occupational Therapy Not Seen Note    Patient not available for Occupational Therapy due to:    [] Testing:    [] Hemodialysis    [] Blood Transfusion in Progress    []Refusal by Patient:    [] Surgery/Procedure:    [] Strict Bedrest    [] Sedation    [] Spine Precautions     [] Pt being transferred to palliative care at this time. Spoke with pt/family and OT services to be defered. [] Pt independent with functional mobility and functional tasks. Pt with no OT acute care needs at this time, will defer OT eval.    [x] Other: Per RN pt not actively following commands at this time. Next Scheduled Treatment: Attempt on 10/13 as appropriate. Signature:  Holly Tony OTR/JAE

## 2017-10-13 ENCOUNTER — APPOINTMENT (OUTPATIENT)
Dept: MRI IMAGING | Age: 72
DRG: 023 | End: 2017-10-13
Payer: COMMERCIAL

## 2017-10-13 ENCOUNTER — HOSPITAL ENCOUNTER (INPATIENT)
Dept: DIALYSIS | Age: 72
Discharge: HOME | DRG: 023 | End: 2017-10-13
Payer: COMMERCIAL

## 2017-10-13 ENCOUNTER — APPOINTMENT (OUTPATIENT)
Dept: ULTRASOUND IMAGING | Age: 72
DRG: 023 | End: 2017-10-13
Payer: COMMERCIAL

## 2017-10-13 ENCOUNTER — APPOINTMENT (OUTPATIENT)
Dept: INTERVENTIONAL RADIOLOGY/VASCULAR | Age: 72
DRG: 023 | End: 2017-10-13
Payer: COMMERCIAL

## 2017-10-13 PROBLEM — A41.51 SEPSIS DUE TO ESCHERICHIA COLI (HCC): Status: ACTIVE | Noted: 2017-10-13

## 2017-10-13 PROBLEM — D69.6 THROMBOCYTOPENIA (HCC): Status: ACTIVE | Noted: 2017-10-13

## 2017-10-13 PROBLEM — G93.40 ACUTE ENCEPHALOPATHY: Status: ACTIVE | Noted: 2017-10-13

## 2017-10-13 PROBLEM — E87.20 LACTIC ACID ACIDOSIS: Status: ACTIVE | Noted: 2017-10-13

## 2017-10-13 PROBLEM — D62 ACUTE BLOOD LOSS ANEMIA: Status: ACTIVE | Noted: 2017-10-13

## 2017-10-13 LAB
ABSOLUTE EOS #: 0 K/UL (ref 0–0.4)
ABSOLUTE LYMPH #: 0.63 K/UL (ref 1–4.8)
ABSOLUTE MONO #: 1.05 K/UL (ref 0.1–0.8)
ANION GAP SERPL CALCULATED.3IONS-SCNC: 14 MMOL/L (ref 9–17)
BASOPHILS # BLD: 0 %
BASOPHILS ABSOLUTE: 0 K/UL (ref 0–0.2)
BUN BLDV-MCNC: 71 MG/DL (ref 8–23)
BUN/CREAT BLD: ABNORMAL (ref 9–20)
CALCIUM SERPL-MCNC: 7.2 MG/DL (ref 8.6–10.4)
CHLORIDE BLD-SCNC: 109 MMOL/L (ref 98–107)
CO2: 20 MMOL/L (ref 20–31)
CREAT SERPL-MCNC: 3.08 MG/DL (ref 0.5–0.9)
CULTURE: ABNORMAL
CULTURE: ABNORMAL
DIFFERENTIAL TYPE: ABNORMAL
EKG ATRIAL RATE: 99 BPM
EKG P AXIS: 46 DEGREES
EKG P-R INTERVAL: 152 MS
EKG Q-T INTERVAL: 380 MS
EKG QRS DURATION: 94 MS
EKG QTC CALCULATION (BAZETT): 487 MS
EKG R AXIS: -15 DEGREES
EKG T AXIS: 47 DEGREES
EKG VENTRICULAR RATE: 99 BPM
EOSINOPHILS RELATIVE PERCENT: 0 %
GFR AFRICAN AMERICAN: 18 ML/MIN
GFR NON-AFRICAN AMERICAN: 15 ML/MIN
GFR SERPL CREATININE-BSD FRML MDRD: ABNORMAL ML/MIN/{1.73_M2}
GFR SERPL CREATININE-BSD FRML MDRD: ABNORMAL ML/MIN/{1.73_M2}
GLUCOSE BLD-MCNC: 117 MG/DL (ref 70–99)
GLUCOSE BLD-MCNC: 125 MG/DL (ref 65–105)
HCT VFR BLD CALC: 20.6 % (ref 36–46)
HCT VFR BLD CALC: 21.1 % (ref 36–46)
HCT VFR BLD CALC: 21.2 % (ref 36–46)
HCT VFR BLD CALC: 24.8 % (ref 36–46)
HEMOGLOBIN: 7 G/DL (ref 12–16)
HEMOGLOBIN: 7.2 G/DL (ref 12–16)
HEMOGLOBIN: 7.3 G/DL (ref 12–16)
HEMOGLOBIN: 8.4 G/DL (ref 12–16)
LACTIC ACID, WHOLE BLOOD: 3.8 MMOL/L (ref 0.7–2.1)
LACTIC ACID: ABNORMAL MMOL/L
LV EF: 70 %
LVEF MODALITY: NORMAL
LYMPHOCYTES # BLD: 3 %
Lab: ABNORMAL
MCH RBC QN AUTO: 31.1 PG (ref 26–34)
MCHC RBC AUTO-ENTMCNC: 34.4 G/DL (ref 31–37)
MCV RBC AUTO: 90.5 FL (ref 80–100)
MONOCYTES # BLD: 5 %
MORPHOLOGY: NORMAL
NUCLEATED RED BLOOD CELLS: 2 PER 100 WBC
ORGANISM: ABNORMAL
PDW BLD-RTO: 15.2 % (ref 12.5–15.4)
PLATELET # BLD: 106 K/UL (ref 140–450)
PLATELET ESTIMATE: ABNORMAL
PMV BLD AUTO: 8.4 FL (ref 6–12)
POTASSIUM SERPL-SCNC: 4.5 MMOL/L (ref 3.7–5.3)
PROCALCITONIN: 1.43 NG/ML
RBC # BLD: 2.33 M/UL (ref 4–5.2)
RBC # BLD: ABNORMAL 10*6/UL
SEG NEUTROPHILS: 92 %
SEGMENTED NEUTROPHILS ABSOLUTE COUNT: 19.22 K/UL (ref 1.8–7.7)
SODIUM BLD-SCNC: 143 MMOL/L (ref 135–144)
SPECIMEN DESCRIPTION: ABNORMAL
STATUS: ABNORMAL
WBC # BLD: 20.9 K/UL (ref 3.5–11)
WBC # BLD: ABNORMAL 10*3/UL

## 2017-10-13 PROCEDURE — 97535 SELF CARE MNGMENT TRAINING: CPT | Performed by: OCCUPATIONAL THERAPIST

## 2017-10-13 PROCEDURE — 85018 HEMOGLOBIN: CPT

## 2017-10-13 PROCEDURE — 93306 TTE W/DOPPLER COMPLETE: CPT

## 2017-10-13 PROCEDURE — C1769 GUIDE WIRE: HCPCS

## 2017-10-13 PROCEDURE — 85014 HEMATOCRIT: CPT

## 2017-10-13 PROCEDURE — 6360000002 HC RX W HCPCS: Performed by: INTERNAL MEDICINE

## 2017-10-13 PROCEDURE — G8978 MOBILITY CURRENT STATUS: HCPCS

## 2017-10-13 PROCEDURE — 94762 N-INVAS EAR/PLS OXIMTRY CONT: CPT

## 2017-10-13 PROCEDURE — 93005 ELECTROCARDIOGRAM TRACING: CPT

## 2017-10-13 PROCEDURE — 97110 THERAPEUTIC EXERCISES: CPT

## 2017-10-13 PROCEDURE — 99231 SBSQ HOSP IP/OBS SF/LOW 25: CPT | Performed by: INTERNAL MEDICINE

## 2017-10-13 PROCEDURE — 2000000003 HC NEURO ICU R&B

## 2017-10-13 PROCEDURE — 2580000003 HC RX 258: Performed by: INTERNAL MEDICINE

## 2017-10-13 PROCEDURE — 77001 FLUOROGUIDE FOR VEIN DEVICE: CPT

## 2017-10-13 PROCEDURE — 97162 PT EVAL MOD COMPLEX 30 MIN: CPT

## 2017-10-13 PROCEDURE — 2580000003 HC RX 258: Performed by: EMERGENCY MEDICINE

## 2017-10-13 PROCEDURE — 85025 COMPLETE CBC W/AUTO DIFF WBC: CPT

## 2017-10-13 PROCEDURE — 5A1D70Z PERFORMANCE OF URINARY FILTRATION, INTERMITTENT, LESS THAN 6 HOURS PER DAY: ICD-10-PCS | Performed by: INTERNAL MEDICINE

## 2017-10-13 PROCEDURE — 6360000002 HC RX W HCPCS: Performed by: EMERGENCY MEDICINE

## 2017-10-13 PROCEDURE — G8988 SELF CARE GOAL STATUS: HCPCS | Performed by: OCCUPATIONAL THERAPIST

## 2017-10-13 PROCEDURE — 76937 US GUIDE VASCULAR ACCESS: CPT

## 2017-10-13 PROCEDURE — 36415 COLL VENOUS BLD VENIPUNCTURE: CPT

## 2017-10-13 PROCEDURE — 83605 ASSAY OF LACTIC ACID: CPT

## 2017-10-13 PROCEDURE — 36556 INSERT NON-TUNNEL CV CATH: CPT

## 2017-10-13 PROCEDURE — 82947 ASSAY GLUCOSE BLOOD QUANT: CPT

## 2017-10-13 PROCEDURE — 99223 1ST HOSP IP/OBS HIGH 75: CPT | Performed by: PSYCHIATRY & NEUROLOGY

## 2017-10-13 PROCEDURE — 84145 PROCALCITONIN (PCT): CPT

## 2017-10-13 PROCEDURE — 02H633Z INSERTION OF INFUSION DEVICE INTO RIGHT ATRIUM, PERCUTANEOUS APPROACH: ICD-10-PCS | Performed by: RADIOLOGY

## 2017-10-13 PROCEDURE — 2500000003 HC RX 250 WO HCPCS: Performed by: INTERNAL MEDICINE

## 2017-10-13 PROCEDURE — G8979 MOBILITY GOAL STATUS: HCPCS

## 2017-10-13 PROCEDURE — 80048 BASIC METABOLIC PNL TOTAL CA: CPT

## 2017-10-13 PROCEDURE — G8996 SWALLOW CURRENT STATUS: HCPCS

## 2017-10-13 PROCEDURE — 92610 EVALUATE SWALLOWING FUNCTION: CPT

## 2017-10-13 PROCEDURE — 99221 1ST HOSP IP/OBS SF/LOW 40: CPT | Performed by: PHYSICAL MEDICINE & REHABILITATION

## 2017-10-13 PROCEDURE — 99233 SBSQ HOSP IP/OBS HIGH 50: CPT | Performed by: PSYCHIATRY & NEUROLOGY

## 2017-10-13 PROCEDURE — 6360000002 HC RX W HCPCS: Performed by: RADIOLOGY

## 2017-10-13 PROCEDURE — 97167 OT EVAL HIGH COMPLEX 60 MIN: CPT | Performed by: OCCUPATIONAL THERAPIST

## 2017-10-13 PROCEDURE — G8987 SELF CARE CURRENT STATUS: HCPCS | Performed by: OCCUPATIONAL THERAPIST

## 2017-10-13 PROCEDURE — G8997 SWALLOW GOAL STATUS: HCPCS

## 2017-10-13 RX ORDER — PANTOPRAZOLE SODIUM 40 MG/10ML
40 INJECTION, POWDER, LYOPHILIZED, FOR SOLUTION INTRAVENOUS DAILY
Status: DISCONTINUED | OUTPATIENT
Start: 2017-10-13 | End: 2017-10-13 | Stop reason: SDUPTHER

## 2017-10-13 RX ORDER — HEPARIN SODIUM 1000 [USP'U]/ML
1400 INJECTION, SOLUTION INTRAVENOUS; SUBCUTANEOUS PRN
Status: DISCONTINUED | OUTPATIENT
Start: 2017-10-13 | End: 2017-10-18

## 2017-10-13 RX ORDER — HEPARIN SODIUM 5000 [USP'U]/ML
INJECTION, SOLUTION INTRAVENOUS; SUBCUTANEOUS
Status: COMPLETED | OUTPATIENT
Start: 2017-10-13 | End: 2017-10-13

## 2017-10-13 RX ORDER — ESOMEPRAZOLE SODIUM 40 MG/5ML
40 INJECTION INTRAVENOUS DAILY
Status: DISCONTINUED | OUTPATIENT
Start: 2017-10-14 | End: 2017-10-18

## 2017-10-13 RX ORDER — HEPARIN SODIUM 1000 [USP'U]/ML
1500 INJECTION, SOLUTION INTRAVENOUS; SUBCUTANEOUS PRN
Status: DISCONTINUED | OUTPATIENT
Start: 2017-10-13 | End: 2017-10-18

## 2017-10-13 RX ORDER — FENTANYL CITRATE 50 UG/ML
50 INJECTION, SOLUTION INTRAMUSCULAR; INTRAVENOUS ONCE
Status: DISCONTINUED | OUTPATIENT
Start: 2017-10-13 | End: 2017-10-15

## 2017-10-13 RX ADMIN — CEFTRIAXONE SODIUM 1 G: 1 INJECTION, SOLUTION INTRAVENOUS at 17:31

## 2017-10-13 RX ADMIN — METOPROLOL TARTRATE 5 MG: 5 INJECTION INTRAVENOUS at 17:31

## 2017-10-13 RX ADMIN — Medication 10 ML: at 22:47

## 2017-10-13 RX ADMIN — HEPARIN SODIUM 5000 UNITS: 5000 INJECTION, SOLUTION INTRAVENOUS; SUBCUTANEOUS at 15:53

## 2017-10-13 RX ADMIN — Medication 10 ML: at 09:41

## 2017-10-13 RX ADMIN — METOPROLOL TARTRATE 5 MG: 5 INJECTION INTRAVENOUS at 22:46

## 2017-10-13 RX ADMIN — HEPARIN SODIUM 1500 UNITS: 1000 INJECTION, SOLUTION INTRAVENOUS; SUBCUTANEOUS at 22:06

## 2017-10-13 RX ADMIN — HEPARIN SODIUM 1400 UNITS: 1000 INJECTION, SOLUTION INTRAVENOUS; SUBCUTANEOUS at 22:07

## 2017-10-13 RX ADMIN — HEPARIN SODIUM 5000 UNITS: 5000 INJECTION, SOLUTION INTRAVENOUS; SUBCUTANEOUS at 15:49

## 2017-10-13 RX ADMIN — Medication 10 ML: at 09:40

## 2017-10-13 RX ADMIN — METOPROLOL TARTRATE 5 MG: 5 INJECTION INTRAVENOUS at 03:22

## 2017-10-13 RX ADMIN — METOPROLOL TARTRATE 5 MG: 5 INJECTION INTRAVENOUS at 09:21

## 2017-10-13 ASSESSMENT — PAIN SCALES - GENERAL: PAINLEVEL_OUTOF10: 0

## 2017-10-13 NOTE — PROGRESS NOTES
malnutrition  18.524.9 Normal  2529.9 Overweight (not obese)  3034.9 Obese class 1 (Low Risk)  3539.9 Obese class 2 (Moderate Risk)  ? 40 Obese class 3 (High Risk)    NEURO: Lethargic, wakes up to noxious stimulus, follows commands on the R side, L sided neglect. CVS: S1 S2.   PULMONARY: Clear to ausculation. GI: Soft, non-tender. EXT: Difficult to assess edema. Assessment/PLAN:  Patient is a 67year old  lady with pmhx ESRD on hemodialysis, presenting to the ED with acute left sided weakness and R MCA syndrome.    - Failed thrombectomy with multiple attempts. - CT brain with R MCA territory infarction. - Cerebral edema evolvement. - Anti-platelet therapy. - Statin therapy. - Keep monitoring in ICU.  - Repeat H&H given drop. - Hemodynamically stable. - Appreciate nephrology consultation.  - Antibiotics for UTI. - Will continue to follow.      Ela Coleman MD  Neuro Critical Care Team  Pager [phone]  10/13/2017  11:13 AM

## 2017-10-13 NOTE — PROGRESS NOTES
Speech Language Pathology  Facility/Department: [address]   BEDSIDE SWALLOW EVALUATION    NAME: Bib Siegel  : 1945  MRN: [id number]    ADMISSION DATE: 10/11/2017  ADMITTING DIAGNOSIS: has HTN (hypertension); Hep C w/o coma, chronic ([address] Inadequate dialysis; ESRD on hemodialysis ([address] ESRD (end stage renal disease) ([address] Morbid obesity ([address] Left-sided weakness; Cerebrovascular accident (CVA) due to thrombosis of right middle cerebral artery ([address] Weakness of extremity; Left-sided neglect; and Cerebrovascular accident (CVA) due to embolism of right middle cerebral artery ([facility] [address] on her problem list.    Date of Eval: 10/13/2017  Evaluating Therapist: Beverly Trammell    Current Diet level:  Current Diet : NPO  Current Liquid Diet : NPO      Primary Complaint: The patient is a 67 y.o. female with past medical history significant for Hypertension, ESRD on HD TSS schedule, Chronic hep C,  presented with chief complaint of weakness. She is poor historian. Pt pressed life alert button at 15.55 today. Pt last known well time is 15.40. Weakness is on left UE and LE associated with left facial droop and right gaze preference. Otherwise pt is AO X 3 and answering all questions. Pt completed her dialysis session yesterday. NIH is 10. CT Head wo contrast showed dense Rt MCA sign. Pt was evaluated in the ED and given TPA at 5 pm.       Pain:  Pain Assessment  Patient Currently in Pain: Unable to Assess  Pain Level: 0    Reason for Referral  Bib Siegel was referred for a bedside swallow evaluation to assess the efficiency of her swallow function, rule out aspiration and make recommendations regarding safe dietary consistencies, effective compensatory strategies, and safe eating environment. Impression: +s/s of aspiration with ice chip trial. Pt. Not awake and alert for PO intake at this time. Recommend alternative means of nutrition and hydration until pt.  Able to maintain wakeful state for repeat BSSE completion     Dysphagia Outcome Severity Scale: Level 1: Severe dysphagia- NPO. Unable to tolerate any PO safely     Treatment Plan  Requires SLP Intervention: Yes  Duration/Frequency of Treatment: 2-3X/week          Recommended Diet and Intervention  Diet Solids Recommendation: NPO (with alternative means of nutrition)  Liquid Consistency Recommendation: NPO (with alternative means of hydration)  Recommended Form of Meds: Via alternative means of nutrition          Treatment/Goals  Dysphagia Goals: The patient will tolerate repeat BSE when able. General  Chart Reviewed: Yes  Behavior/Cognition: Lethargic  Respiratory Status: Room air  Communication Observation: Non-verbal  Follows Directions: None  Patient Positioning: Upright in bed  Baseline Vocal Quality: Normal  Consistencies Trialed: Ice Chips       Oral Motor Deficits  Oral/Motor  Oral Motor: Exceptions to WFL (decreased ROM and strength)    Oral Phase Dysfunction  Oral Phase  Oral Phase: Exceptions  Oral Phase  Oral Phase - Comment: open mouth posture. + ice chip with + oral loss from Left. decreased bolus formation and decreased oral movement     Indicators of Pharyngeal Phase Dysfunction   Pharyngeal Phase  Pharyngeal Phase: Exceptions  Pharyngeal Phase   Pharyngeal: + latent throat clear noted after single attemtp at ice chip. Pt. with decreased alert and awale levels and not appropriate for further PO trials at this time. Prognosis  Prognosis  Prognosis for safe diet advancement: fair    Education  Patient Education: yes  Patient Education Response: No evidence of learning      G-Code  SLP G-Codes  Functional Limitations: Swallowing  Swallow Current Status (): 100 percent impaired, limited or restricted  Swallow Goal Status ():  At least 80 percent but less than 100 percent impaired, limited or restricted         Therapy Time  SLP Individual Minutes  Time In: 0930  Time Out: 0936  Minutes: Greg

## 2017-10-13 NOTE — CONSULTS
[facility] - Initial Consult Note  Today's Date and Time: 10/13/2017, 10:48 AM    Impression :   1. Sepsis in the setting of 3/4 SIRS criteria. -secondary to UTI   · Previous history of UTI + citrobacter freundii on 10-1-17  2. Occlusion of the Rt MCA s/p tPA and unsuccessful thrombectomy. 3. ESRD on HD but makes urine  4. Morbid obesity  5. HLD  6. HTN    Recommendations     · Continue Rocephin. · Discontinue Vancomycin. · Follow up on blood and urine cultures. · Monitor I/O. · Monitor vitals and CBC. Chief complaint/reason for consultation:   Sepsis with no source. History of Present Illness:     INITIAL HISTORY:    Nelson Bryant is a 67y.o.-year-old  female who was initially admitted on 10/11/2017. Patient seen at the request of Dr. Jessica Jasmine.    Patient has a past medical history of ESRD on HD, morbid obesity, Hepatitis C, and HTN. Patient was brought to the emergency room after falling out of her wheelchair on 10-11-17. Stroke team evaluated patient and noticed left facial droop, left hemiparesis, and right sided gaze preference. NIH around 10. Underwent imaging and noted to have MCA occlusion and tPA was administered. Patient also underwent attempted M1 MCA thrombectomy which was unsuccessful. Infectious disease is consulted for sepsis with no identifiable source. Patient last 24-48 hours has remained tachycardic 100-130's & tachypneic. Afebrile. WBC count upon admission was 6.9 which has trended up to 34.5 yesterday. Blood cultures drawn yesterday and no growth 17 hours. UA 10-12-17 shows moderate LE, few bacteria, and numerous WBC. Urine culture in progress. Patient had a recent urine culture on 10-1-17 that grew citrobacter freundii. KUB shows no signs of obstruction. CXR 10-12-17 shows no acute process. Patient currently on Rocephin 1 g Q 24 hours (one dose received). Urine I/O 5150/4945 last 24 hours (1.75 mL/kg/hr).  Previous UTI was sensitive Topics    Smoking status: Never Smoker    Smokeless tobacco: Never Used    Alcohol use No    Drug use: No    Sexual activity: Not on file     Other Topics Concern    Not on file     Social History Narrative    No narrative on file       Family History:   History reviewed. No pertinent family history. Allergies: Other     Review of Systems:     Unable to access due to mentation. Physical Examination :   Patient Vitals for the past 8 hrs:   BP Temp Temp src Pulse Resp SpO2   10/13/17 0903 (!) 147/134 - - 108 22 99 %   10/13/17 0803 (!) 132/108 - - 105 21 98 %   10/13/17 0703 (!) 121/33 98.2 °F (36.8 °C) Oral 101 20 97 %   10/13/17 0605 (!) 114/38 - - 100 21 97 %   10/13/17 0505 (!) 112/42 - - 108 21 100 %   10/13/17 0454 (!) 113/36 97.9 °F (36.6 °C) Oral 104 21 -   10/13/17 0453 - - - 103 19 99 %   10/13/17 0305 (!) 109/24 - - 109 22 93 %   10/13/17 0255 (!) 144/109 - - - - -     General Appearance: Difficult to arouse. Head:  Normocephalic, no trauma  ENT: Oropharynx clear, without erythema, exudate, or thrush. No tenderness of sinuses. Mouth/throat: mucosa pink and moist. No lesions. Dentition in good repair. Neck:Supple, without lymphadenopathy. Thyroid normal, No bruits. Pulmonary/Chest: Clear to auscultation, without wheezes, rales, or rhonchi. No dullness to percussion. No egophony. Cardiovascular: Regular rate and rhythm without murmurs, rubs, or gallops. Abdomen: Limited to body habitus. Soft, non tender. Bowel sounds normal. No organomegaly  All four Extremities:Left AV fistula. No cyanosis, clubbing, edema, or effusions. Neurologic: Left sided weakness 3/5, right sided gaze. Speech dysarthric. Skin: Warm and dry with good turgor. No signs of peripheral arterial or venous insufficiency.     Medical Decision Making:   I have independently reviewed/ordered the following labs:    CBC with Differential: Recent Labs      10/11/17   3466  10/12/17   0618   10/12/17   1831  10/12/17   2241 WBC  22.3*  34.5*   --    --    --    HGB  12.0  12.3   < >  10.8*  10.1*   HCT  35.9*  35.8*   < >  32.9*  30.3*   PLT  196  182   --    --    --    LYMPHOPCT  4  5   --    --    --    MONOPCT  5  4   --    --    --     < > = values in this interval not displayed. BMP: Recent Labs      10/11/17   1605  10/12/17   0618   NA  138  137   K  3.9  4.1   CL  97*  100   CO2  24  17*   BUN  49*  54*   CREATININE  1.80*  2.29*     Lab Results   Component Value Date    MUCUS NOT REPORTED 10/12/2017    RBC 3.96 10/12/2017    TRICHOMONAS NOT REPORTED 10/12/2017    WBC 34.5 10/12/2017    YEAST NOT REPORTED 10/12/2017    TURBIDITY CLOUDY 10/12/2017     Lab Results   Component Value Date    CREATININE 2.29 10/12/2017    GLUCOSE 211 10/12/2017     CT head 10-12-17  Impression   1. Evolving right MCA infarct.  Increased edema since comparison exam with   slight positive mass effect but no significant midline shift. 2. No intracranial hemorrhage.         KUB 10-12-17  Impression   No evidence of bowel obstruction. CXR 10-12-17  Impression   No acute process.       Right jugular central venous catheter has been repositioned and now   terminates near the junction of the SVC and the right atrium         CXR 10-12-17 1st   Impression   No acute process.       Hiatal hernia       Right jugular central venous line tip appears to be in the inferior aspect of   the right atrium             10/1/2017 10:38 PM - Erasmo, [facility] Incoming Lab Results From [facility]     Component Results     Component Collected Lab   Specimen Description 09/30/2017  8:00  [address]   . CLEAN CATCH URINE    Special Requests 09/30/2017  8:00  [address]   NOT REPORTED    Culture  (Abnormal) 09/30/2017  8:00 AM [facility] FREUNDII >[id number] CFU/ML     Culture 09/30/2017  8:00 AM [facility] [address] [facility], [address] [place] [address] [phone]    Status 09/30/2017

## 2017-10-13 NOTE — PLAN OF CARE
Problem: NEUROLOGICAL DEFICIT  Goal: Neurological status is stable or improving  Outcome: Ongoing  Neuro assessment completed, fall precautions in place, aspirations precautions in place, assess for barriers in communication and mobility, interventions to assist in communication and mobility in place, encouraged to call for assistance, adaptive devices used as needed, assess emotional state and support offered, encouraged patient to communicate by available means, and support systems included in patient care. Problem: Falls - Risk of:  Goal: Will remain free from falls  Will remain free from falls   Outcome: Ongoing  Fall risk precautions in place. Bed in lowest position with wheels locked, bed alarm in place and activated, orange blanket on bed, non-skid socks on pt, fall risk id on pt, call light in reach, pt encouraged to call before getting out of bed and for any other needs or c/o.

## 2017-10-13 NOTE — PROGRESS NOTES
Medications:      fentanNYL  50 mcg Intravenous Once    pantoprazole  40 mg Intravenous Daily    metoprolol  5 mg Intravenous Q6H    insulin lispro  0-6 Units Subcutaneous TID WC    insulin lispro  0-3 Units Subcutaneous Nightly    aspirin  81 mg Oral Once    cefTRIAXone (ROCEPHIN) IV  1 g Intravenous Q24H    sodium chloride flush  10 mL Intravenous 2 times per day    sodium chloride flush  10 mL Intravenous 2 times per day    sodium chloride flush  10 mL Intravenous 2 times per day    atorvastatin  40 mg Oral Nightly    0.9 % sodium chloride  250 mL Intravenous Once    sodium chloride  250 mL Intravenous Once       Diagnostic Labs   CBC: Recent Labs      10/11/17   2327  10/12/17   0618   10/12/17   2242  10/13/17   1047  10/13/17   1128   WBC  22.3*  34.5*   --    --   20.9*   --    RBC  3.93*  3.96*   --    --   2.33*   --    HGB  12.0  12.3   < >  10.1*  7.3*  7.2*   HCT  35.9*  35.8*   < >  30.3*  21.1*  21.2*   MCV  91.2  90.3   --    --   90.5   --    RDW  14.5  14.7   --    --   15.2   --    PLT  196  182   --    --   106*   --     < > = values in this interval not displayed. BMP: Recent Labs      10/11/17   1605  10/12/17   0618  10/13/17   1047   NA  138  137  143   K  3.9  4.1  4.5   CL  97*  100  109*   CO2  24  17*  20   BUN  49*  54*  71*   CREATININE  1.80*  2.29*  3.08*     BNP: No results for input(s): BNP in the last 72 hours. PT/INR:   Recent Labs      10/11/17   1605   PROTIME  10.1   INR  0.9     APTT:   Recent Labs      10/11/17   1605   APTT  21.4     CARDIAC ENZYMES: Recent Labs      10/11/17   1603  10/11/17   1605   CKMB   --   1.3   TROPONINI  0.00   --        Imaging    Xr Abdomen (kub) (single Ap View)    Result Date: 10/13/2017    No evidence of bowel obstruction. Ct Head Without Contrast    Result Date: 10/13/2017  1. Evolving right MCA infarct. Increased edema since comparison exam with slight positive mass effect but no significant midline shift.  2. No team   - Ceftriaxone for UTI and sepsis   - encephalopathy could be from brain edema/ metabolic encephalo  - concern for GIB vs retroperitoneal bleeding. No abd CT for now and no vanco. If Hb is dropping more, I would discuss with Neuro team for re consideration of abd CT/ vanco  - hold on kid US for now   - PPI IV due to Hx of hiatal hernia and blood loss   - watch platelets     Discharge planning: Activity: as tolerated  Disposition: SNF  PO intake: Diet NPO Effective Now  Hernandez: Yes    Quintin Ferreira, PGY-3, Internal Medicine Resident  [address] [facility]  Attending Physician Statement  I have discussed the care of Marcy Frye, including pertinent history and exam findings,  with the resident. I have reviewed the key elements of all parts of the encounter with the resident. I agree with the assessment, plan and orders as documented by the resident. Pt seen discussed and examined. (GC Modifier) Somewhat less responsive. WBC 20.0 (34.5 yest). Pyuria. On Rocephin. No improvement in neurologic deficit.

## 2017-10-13 NOTE — PROGRESS NOTES
devices: Left in bed, Nurse notified    G-Code  PT G-Codes  Functional Assessment Tool Used: [place] Tool  Score: 0  Functional Limitation: Mobility: Walking and moving around  Mobility: Walking and Moving Around Current Status (): At least 80 percent but less than 100 percent impaired, limited or restricted  Mobility: Walking and Moving Around Goal Status (): At least 80 percent but less than 100 percent impaired, limited or restricted    Goals  Short term goals  Time Frame for Short term goals: 14 visits  Short term goal 1: Prevent contractures through ROM and stretching  Short term goal 2: Pt to follow most commands for AROM exercises RUE/LE. Short term goal 3: Pt to sit EOB Max A. Short term goal 4: Progress to out of bed mobility as appropriate.   Patient Goals   Patient goals : Unable to state       Therapy Time   Individual Concurrent Group Co-treatment   Time In 7165         Time Out 0858         Minutes 24         Timed Code Treatment Minutes: One Freda jo, PT

## 2017-10-13 NOTE — BRIEF OP NOTE
Brief Postoperative Note    Tila Johnson  YOB: 1945  [id number]    Pre-operative Diagnosis: ESRD; malfunctioning left arm fistula    Post-operative Diagnosis: Same    Procedure: Right internal jugular temporary dialysis catheter placement    Anesthesia: Local    Surgeons/Assistants: Homer    Estimated Blood Loss: less than 50     Complications: None    Specimens: Was Not Obtained    Electronically signed by Zan Talley MD on 10/13/2017 at 4:00 PM

## 2017-10-13 NOTE — PROGRESS NOTES
NEUROLOGY INPATIENT PROGRESS NOTE    10/13/2017         Subjective: Tila Johnson is a  67 y.o. female admitted on 10/11/2017 with Acute left-sided weakness [M62.89]      Briefly, this is a  67 y.o. female admitted on 10/11/2017 with  hx of HTN, scleroderma, ESRD, on hemodialysis was admitted on 10/11/2017 with Lt hemiparesis and Rt gaze preference found to have dense Rt MCA sign on CT head and Rt M1 MCA occlusion  has had attempted and unsuccessful Rt M1 thrombectomy. No current facility-administered medications on file prior to encounter. Current Outpatient Prescriptions on File Prior to Encounter   Medication Sig Dispense Refill    ondansetron (ZOFRAN ODT) 4 MG disintegrating tablet Take 1 tablet by mouth every 8 hours as needed for Nausea or Vomiting 10 tablet 0    Calcium Carbonate-Vit D-Min (CALCIUM 1200 PO) Take 1 capsule by mouth daily      phosphorus (K PHOS NEUTRAL) [phone] MG tablet Take 1 tablet by mouth daily      Cholecalciferol (VITAMIN D) 2000 UNITS TABS tablet Take 1 tablet by mouth daily 30 tablet 3    B Complex-C-Folic Acid (NEPHRO-GEOVANNI) 0.8 MG TABS Take 1 tablet by mouth daily 30 tablet 3       Allergies: Tila Johnson is allergic to other. Past Medical History:   Diagnosis Date    Arthritis     Blood circulation, collateral     legs    Cancer ([facility]     uterine. Hysterectomy in 1978    Chronic kidney disease 2016    Hemodialysis patient ([facility] [address] [facility]     Mey Rosario on [facility], sat    History of blood transfusion     Hypertension     Liver disease     Hepatitis C    MRSA (methicillin resistant staph aureus) culture positive 10/11/2017    nares    Neuromuscular disorder ([facility] [facility]     Scleroderma       Past Surgical History:   Procedure Laterality Date    CHOLECYSTECTOMY      DIALYSIS FISTULA CREATION      HYSTERECTOMY      TONSILLECTOMY      TUNNELED VENOUS CATHETER PLACEMENT Right 09/14/2016    Rt.  IJ judy split insertion/ exchange over [employer] TUNNELED VENOUS CATHETER PLACEMENT Right 10/10/2016    REMOVAL NONFUNCTIONING HD CATH, EXCANGED WITH NEW           Medications:     fentanNYL  50 mcg Intravenous Once    vancomycin  1,500 mg Intravenous Once    metoprolol  5 mg Intravenous Q6H    insulin lispro  0-6 Units Subcutaneous TID WC    insulin lispro  0-3 Units Subcutaneous Nightly    aspirin  81 mg Oral Once    cefTRIAXone (ROCEPHIN) IV  1 g Intravenous Q24H    sodium chloride flush  10 mL Intravenous 2 times per day    sodium chloride flush  10 mL Intravenous 2 times per day    sodium chloride flush  10 mL Intravenous 2 times per day    atorvastatin  40 mg Oral Nightly    0.9 % sodium chloride  250 mL Intravenous Once    sodium chloride  250 mL Intravenous Once     PRN Meds include: glucose, dextrose, glucagon (rDNA), dextrose, sodium chloride flush, sodium chloride flush, sodium chloride flush, acetaminophen, magnesium hydroxide, ondansetron    Objective:   BP (!) 147/134   Pulse 108   Temp 98.2 °F (36.8 °C) (Oral)   Resp 22   Ht 5' 5\" (1.651 m)   Wt 260 lb (117.9 kg)   LMP  (LMP Unknown) Comment: post menopausal  SpO2 99%   BMI 43.27 kg/m²     Blood pressure range: Systolic (91JQO), XKY:993 , Min:100 , TZD:382   ; Diastolic (52ZMQ), MARIANA:98, Min:15, Max:134        dextrose      phenylephrine (OSITO-SYNEPHRINE) 50mg/250mL infusion 200 mcg/min (10/12/17 0023)            ON EXAMINATION:  GENERAL  Appears comfortable and in no distress   HEENT  NC/ AT   NECK  Supple and no bruits heard   MENTAL STATUS:   moderately lethargic, following simple commands, no speech output    CRANIAL NERVES: II     -       Pupils reactive b/l.  Blinks to threat bilaterally  III,IV,VI - EOMI without nystagmus  V     -     VII    -     Left lower facial weakness  VIII   -     Intact hearing grossly  IX,X -     Symmetrical palate  XI    -       XII   -     Midline tongue, no atrophy    MOTOR FUNCTION:  significant for 0/5 in Lt UE and 4/5 in Lt LE and 4/5 in Rt CREATININE  2.39*  1.80*  2.29*   GLUCOSE   --   91  211*         Lab Results   Component Value Date    CHOL 122 10/12/2017    LDLCHOLESTEROL 53 10/12/2017    HDL 51 10/12/2017    TRIG 88 10/12/2017    ALT 14 11/19/2016    AST 22 11/19/2016    TSH 1.36 10/12/2017    INR 0.9 10/11/2017    LABA1C 4.9 10/12/2017    LABMICR 204 03/29/2014    OPEJZRLQ82 2928 (H) 03/28/2014        Impression and Plan: Ms. Mikey Patel is a 67 y.o. female with   Large Rt MCA acute infarct; Rt M1 MCA occlusion; post TPA; s/p attempted and unsuccessful Rt M1 thrombectomy (10/11/17); presently on post TPA protocol; continue Lipitor 40mg daily and on neosynephrine with target systolic 969-781. MRI brain not yet done (awaiting checklist)  Presently NPO; speech and swallow eval PT/ OT    Comorbid ESRD on hemodialysis ; HTN, Scleroderma.

## 2017-10-14 ENCOUNTER — APPOINTMENT (OUTPATIENT)
Dept: CT IMAGING | Age: 72
DRG: 023 | End: 2017-10-14
Payer: COMMERCIAL

## 2017-10-14 PROBLEM — E87.20 LACTIC ACID ACIDOSIS: Status: RESOLVED | Noted: 2017-10-13 | Resolved: 2017-10-14

## 2017-10-14 LAB
ABSOLUTE EOS #: 0 K/UL (ref 0–0.4)
ABSOLUTE LYMPH #: 1.5 K/UL (ref 1–4.8)
ABSOLUTE MONO #: 1.5 K/UL (ref 0.1–1.2)
ANION GAP SERPL CALCULATED.3IONS-SCNC: 13 MMOL/L (ref 9–17)
BASOPHILS # BLD: 0 %
BASOPHILS ABSOLUTE: 0 K/UL (ref 0–0.2)
BUN BLDV-MCNC: 41 MG/DL (ref 8–23)
BUN/CREAT BLD: ABNORMAL (ref 9–20)
CALCIUM SERPL-MCNC: 7.4 MG/DL (ref 8.6–10.4)
CHLORIDE BLD-SCNC: 105 MMOL/L (ref 98–107)
CO2: 25 MMOL/L (ref 20–31)
CREAT SERPL-MCNC: 2.21 MG/DL (ref 0.5–0.9)
DIFFERENTIAL TYPE: ABNORMAL
EOSINOPHILS RELATIVE PERCENT: 0 %
GFR AFRICAN AMERICAN: 26 ML/MIN
GFR NON-AFRICAN AMERICAN: 22 ML/MIN
GFR SERPL CREATININE-BSD FRML MDRD: ABNORMAL ML/MIN/{1.73_M2}
GFR SERPL CREATININE-BSD FRML MDRD: ABNORMAL ML/MIN/{1.73_M2}
GLUCOSE BLD-MCNC: 116 MG/DL (ref 65–105)
GLUCOSE BLD-MCNC: 146 MG/DL (ref 65–105)
GLUCOSE BLD-MCNC: 46 MG/DL (ref 65–105)
GLUCOSE BLD-MCNC: 76 MG/DL (ref 65–105)
GLUCOSE BLD-MCNC: 78 MG/DL (ref 65–105)
GLUCOSE BLD-MCNC: 92 MG/DL (ref 70–99)
GLUCOSE BLD-MCNC: 98 MG/DL (ref 65–105)
HCT VFR BLD CALC: 21.5 % (ref 36–46)
HEMOGLOBIN: 7.3 G/DL (ref 12–16)
LYMPHOCYTES # BLD: 8 %
MCH RBC QN AUTO: 31.1 PG (ref 26–34)
MCHC RBC AUTO-ENTMCNC: 33.8 G/DL (ref 31–37)
MCV RBC AUTO: 92.1 FL (ref 80–100)
MONOCYTES # BLD: 8 %
PDW BLD-RTO: 15 % (ref 12.5–15.4)
PLATELET # BLD: 86 K/UL (ref 140–450)
PLATELET ESTIMATE: ABNORMAL
PMV BLD AUTO: 8.4 FL (ref 6–12)
POTASSIUM SERPL-SCNC: 4.2 MMOL/L (ref 3.7–5.3)
RBC # BLD: 2.34 M/UL (ref 4–5.2)
RBC # BLD: ABNORMAL 10*6/UL
SEG NEUTROPHILS: 84 %
SEGMENTED NEUTROPHILS ABSOLUTE COUNT: 15.2 K/UL (ref 1.8–7.7)
SODIUM BLD-SCNC: 143 MMOL/L (ref 135–144)
SODIUM BLD-SCNC: 144 MMOL/L (ref 135–144)
SODIUM BLD-SCNC: 150 MMOL/L (ref 135–144)
SODIUM BLD-SCNC: 151 MMOL/L (ref 135–144)
WBC # BLD: 18.2 K/UL (ref 3.5–11)
WBC # BLD: ABNORMAL 10*3/UL

## 2017-10-14 PROCEDURE — 70450 CT HEAD/BRAIN W/O DYE: CPT

## 2017-10-14 PROCEDURE — 2580000003 HC RX 258: Performed by: INTERNAL MEDICINE

## 2017-10-14 PROCEDURE — 2580000003 HC RX 258: Performed by: EMERGENCY MEDICINE

## 2017-10-14 PROCEDURE — 2500000003 HC RX 250 WO HCPCS: Performed by: STUDENT IN AN ORGANIZED HEALTH CARE EDUCATION/TRAINING PROGRAM

## 2017-10-14 PROCEDURE — 36415 COLL VENOUS BLD VENIPUNCTURE: CPT

## 2017-10-14 PROCEDURE — 84295 ASSAY OF SERUM SODIUM: CPT

## 2017-10-14 PROCEDURE — 2500000003 HC RX 250 WO HCPCS: Performed by: INTERNAL MEDICINE

## 2017-10-14 PROCEDURE — 99233 SBSQ HOSP IP/OBS HIGH 50: CPT | Performed by: PSYCHIATRY & NEUROLOGY

## 2017-10-14 PROCEDURE — 82947 ASSAY GLUCOSE BLOOD QUANT: CPT

## 2017-10-14 PROCEDURE — 2580000003 HC RX 258: Performed by: PSYCHIATRY & NEUROLOGY

## 2017-10-14 PROCEDURE — 2000000003 HC NEURO ICU R&B

## 2017-10-14 PROCEDURE — 94762 N-INVAS EAR/PLS OXIMTRY CONT: CPT

## 2017-10-14 PROCEDURE — 6360000002 HC RX W HCPCS: Performed by: EMERGENCY MEDICINE

## 2017-10-14 PROCEDURE — 80048 BASIC METABOLIC PNL TOTAL CA: CPT

## 2017-10-14 PROCEDURE — 99232 SBSQ HOSP IP/OBS MODERATE 35: CPT | Performed by: INTERNAL MEDICINE

## 2017-10-14 PROCEDURE — 85025 COMPLETE CBC W/AUTO DIFF WBC: CPT

## 2017-10-14 RX ORDER — 3% SODIUM CHLORIDE 3 G/100ML
75 INJECTION, SOLUTION INTRAVENOUS CONTINUOUS
Status: DISPENSED | OUTPATIENT
Start: 2017-10-14 | End: 2017-10-15

## 2017-10-14 RX ADMIN — METOPROLOL TARTRATE 5 MG: 5 INJECTION INTRAVENOUS at 11:57

## 2017-10-14 RX ADMIN — Medication 10 ML: at 08:34

## 2017-10-14 RX ADMIN — SODIUM CHLORIDE 75 ML/HR: 3 INJECTION, SOLUTION INTRAVENOUS at 12:41

## 2017-10-14 RX ADMIN — CEFTRIAXONE SODIUM 1 G: 1 INJECTION, SOLUTION INTRAVENOUS at 16:39

## 2017-10-14 RX ADMIN — SODIUM CHLORIDE 75 ML/HR: 3 INJECTION, SOLUTION INTRAVENOUS at 19:43

## 2017-10-14 RX ADMIN — METOPROLOL TARTRATE 5 MG: 5 INJECTION INTRAVENOUS at 16:39

## 2017-10-14 RX ADMIN — DEXTROSE MONOHYDRATE 25 ML/HR: 50 INJECTION, SOLUTION INTRAVENOUS at 17:15

## 2017-10-14 RX ADMIN — DEXTROSE MONOHYDRATE 12.5 G: 25 INJECTION, SOLUTION INTRAVENOUS at 17:14

## 2017-10-14 RX ADMIN — METOPROLOL TARTRATE 5 MG: 5 INJECTION INTRAVENOUS at 05:38

## 2017-10-14 RX ADMIN — METOPROLOL TARTRATE 5 MG: 5 INJECTION INTRAVENOUS at 23:10

## 2017-10-14 RX ADMIN — Medication 10 ML: at 08:33

## 2017-10-14 RX ADMIN — ESOMEPRAZOLE SODIUM 40 MG: 40 INJECTION INTRAVENOUS at 08:33

## 2017-10-14 NOTE — PROGRESS NOTES
development of left gaze palsy. Rt M1 MCA occlusion; post TPA; s/p attempted and unsuccessful Rt M1 thrombectomy (10/11/17); presently on post TPA protocol; continue Lipitor 40mg daily and on neosynephrine with target systolic 877-101. MRI brain not yet done (awaiting checklist)  Presently NPO; speech and swallow eval PT/ OT   Comorbid ESRD on hemodialysis ; HTN, Scleroderma.     Case discussed with patient's niece at bedside and neuro critical care attending

## 2017-10-14 NOTE — PROGRESS NOTES
suspension 30 mL, 30 mL, Oral, Daily PRN  ondansetron (ZOFRAN) injection 4 mg, 4 mg, Intravenous, Q6H PRN  atorvastatin (LIPITOR) tablet 40 mg, 40 mg, Oral, Nightly  0.9 % sodium chloride infusion 250 mL, 250 mL, Intravenous, Once  0.9 % sodium chloride bolus, 250 mL, Intravenous, Once  phenylephrine (OSITO-SYNEPHRINE) 50 mg in sodium chloride 0.9 % 250 mL infusion, 100 mcg/min, Intravenous, Continuous    Physical:   VITALS:  BP (!) 128/50   Pulse 93   Temp 98.6 °F (37 °C) (Oral)   Resp 19   Ht 5' 5\" (1.651 m)   Wt 269 lb 2.9 oz (122.1 kg)   LMP  (LMP Unknown) Comment: post menopausal  SpO2 100%   BMI 44.79 kg/m²     On exam today:   Somnolent, opens eyes, dense L hemiparesis. Groin site appears c/d/i. Pedal pulses are 1+ on the right. Data:  CBC:   Lab Results   Component Value Date    WBC 18.2 10/14/2017    RBC 2.34 10/14/2017    HGB 7.3 10/14/2017    HCT 21.5 10/14/2017    MCV 92.1 10/14/2017    RDW 15.0 10/14/2017    PLT 86 10/14/2017     BMP:    Lab Results   Component Value Date     10/14/2017    K 4.2 10/14/2017     10/14/2017    CO2 25 10/14/2017    BUN 41 10/14/2017    CREATININE 2.21 10/14/2017    CALCIUM 7.4 10/14/2017    GFRAA 26 10/14/2017    LABGLOM 22 10/14/2017    GLUCOSE 92 10/14/2017       ICU guidelines/prophylaxis active for the following:    head above bed above 30 degrees    ASSESSMENT:  Mahsa Ochoa is a 67 y.o. female with PMH of ESRD, chronic hep C presented with L hemiparesis and right gaze preference on 10/11/17. She received t-PA. She was found to have R MCA occlusion for which she underwent attempt of thrombectomy which was not successful. PLAN:  Continue aspirin 81 mg daily and lipitor 40 mg daily. Hb stable  SBP goal 140-180  Management per neuro ICU team.   Patient from neuroendovascular prosepctive can be transferred to the stepdown unit. Plan discussed with Dr Colon Dear.    [address] [facility]

## 2017-10-14 NOTE — ED TRIAGE NOTES
PT was made [facility] after consultation with family. BS at 1700=49 D 50 12.5 grams given and D5% started at 25ml/hr physician notified no changes ordered.

## 2017-10-14 NOTE — PROGRESS NOTES
4:20 AM: Spoke to radiologist concerning CT head which shows worsening edema and interval development of approx 7 mm right to left midline shift. Dr. Erinn Quiroz updated and recommends hypertonic saline. Will start 3 percent saline at 60 cc/hr with sodium goal of 150-155.

## 2017-10-14 NOTE — PROGRESS NOTES
Family conference note:    I had a discussion with the brother who is the next of kin with his and niece and discussed the overall potential neurological prognosis. I explained to them the severity of the right sided hemispheric stroke and that she will have permanent deficits affecting her left side and her ability to ambulate and mist likely her ability to be able to swallow. I told them that she is in the face of developing cerebral edema and this is a risk of further deterioration secondary to this. I explained to them that the patient is currently on hypertonic solution to assist with the swelling but it is possible that this is not enough and that she may require surgical management of cerebral edema and the fact that she is 67 is not in her favour of getting that surgery as it will not improve her neurological outcome but only her survival.    Family expressed their wish that if she is to deteriorate further that they do not want further aggressive measures and to not place a breathing tube if she needs one as she did not want to live a life where she is laying in bed and not able to function. Code status changed to [facility] and patient to be seen by palliative care. All questions were answered and patient's family verbalized understanding. Vin Ross [address], Vascular neurology.   [address] [facility] St[facility] [place]

## 2017-10-14 NOTE — PROGRESS NOTES
meters  BMI (Calculated): 44.9  Body mass index is 44.79 kg/m². <16 Severe malnutrition  1616.99 Moderate malnutrition  1718.49 Mild malnutrition  18.524.9 Normal  2529.9 Overweight (not obese)  3034.9 Obese class 1 (Low Risk)  3539.9 Obese class 2 (Moderate Risk)  ? 40 Obese class 3 (High Risk)    NEURO: Lethargic, wakes up to noxious stimulus, follows commands on the R side, L sided neglect. Very dysarthric speech. CVS: S1 S2.   PULMONARY: Clear to ausculation. GI: Soft, non-tender. EXT: Difficult to assess edema. Assessment/PLAN:  Patient is a 67year old  lady with pmhx ESRD on hemodialysis, presenting to the ED with acute left sided weakness and R MCA syndrome.    - Failed thrombectomy with multiple attempts. - CT brain with R MCA territory infarction. - Cerebral edema evolvement and mid line shift on the CT brain 10/14/2017.  - 3% started with a Na goal of 150. - Anti-platelet therapy. - Statin therapy. - Keep monitoring in ICU. - Stable H&H with no need for blood transfusion.   - Hemodynamically stable. - Appreciate nephrology consultation.  - Antibiotics for UTI. - Will continue to follow closely in the ICU.      Iris Barajas MD  Neuro Critical Care Team  Pager [phone]  10/14/2017  3:50 PM

## 2017-10-14 NOTE — PROGRESS NOTES
anemia    Acute encephalopathy    Thrombocytopenia (HCC)    - medical management is with collaboration with Neuro critical team   - Ceftriaxone for UTI and sepsis   - encephalopathy is most likely from brain edema  - cont monitoring Hb    - if no improvement in Neuro status in few days with hypertonic saline, will discuss palliative options with other teams/ family     Discharge planning: Activity: as tolerated  Disposition: SNF  PO intake: Diet NPO Effective Now  Hernandez: Yes    Earnest Burns, PGY-3, Internal Medicine Resident  [facility], [facility]  Attending Physician Statement  I have discussed the care of Pili Ziegler, including pertinent history and exam findings,  with the resident. I have reviewed the key elements of all parts of the encounter with the resident. I agree with the assessment, plan and orders as documented by the resident. Pt seen discussed and examined. (GC Modifier)  No improvement neurologically. Afeb. Normotensive. Imaging with significant cerebral edema.

## 2017-10-14 NOTE — PROGRESS NOTES
[address] Performed By     Tae[address] Jeff Hood Name Director Address Valid Date Range   [address] [place] [address] [facility] [address] 08/30/17 1201-Present   Culture & Susceptibility     CITROBACTER FREUNDII     Antibiotic Interpretation CHLOE Status   amikacin  NOT REPORTED Final   aztreonam Sensitive <=1 SUSCEPTIBLE Final   ceFAZolin  NOT REPORTED Final   cefTRIAXone Sensitive <=1 SUSCEPTIBLE Final   cefepime  NOT REPORTED Final   ciprofloxacin Sensitive <=0.25 SUSCEPTIBLE Final   ertapenem  NOT REPORTED Final   gentamicin Sensitive <=1 SUSCEPTIBLE Final   meropenem  NOT REPORTED Final   nitrofurantoin Sensitive <=16 SUSCEPTIBLE Final   piperacillin-tazobactam Sensitive <=4 SUSCEPTIBLE Final   tigecycline  NOT REPORTED Final   tobramycin Sensitive <=1 SUSCEPTIBLE Final   trimethoprim-sulfamethoxazole Sensitive <=20 SUSCEPTIBLE Final          Thank you for allowing us to participate in the care of this patient. Please call with questions.     Yessica Garibay MD, MS4

## 2017-10-15 LAB
ABO/RH: NORMAL
ABSOLUTE EOS #: 0 K/UL (ref 0–0.4)
ABSOLUTE EOS #: 0 K/UL (ref 0–0.4)
ABSOLUTE LYMPH #: 0.7 K/UL (ref 1–4.8)
ABSOLUTE LYMPH #: 0.7 K/UL (ref 1–4.8)
ABSOLUTE MONO #: 0.8 K/UL (ref 0.1–1.2)
ABSOLUTE MONO #: 0.9 K/UL (ref 0.1–1.2)
ABSOLUTE RETIC #: 0.07 M/UL (ref 0.02–0.1)
ANION GAP SERPL CALCULATED.3IONS-SCNC: 14 MMOL/L (ref 9–17)
ANTIBODY SCREEN: NEGATIVE
ARM BAND NUMBER: NORMAL
BASOPHILS # BLD: 0 %
BASOPHILS # BLD: 0 %
BASOPHILS ABSOLUTE: 0 K/UL (ref 0–0.2)
BASOPHILS ABSOLUTE: 0 K/UL (ref 0–0.2)
BLD PROD TYP BPU: NORMAL
BLOOD BANK SPECIMEN: NORMAL
BUN BLDV-MCNC: 42 MG/DL (ref 8–23)
BUN/CREAT BLD: ABNORMAL (ref 9–20)
CALCIUM SERPL-MCNC: 7.1 MG/DL (ref 8.6–10.4)
CHLORIDE BLD-SCNC: 116 MMOL/L (ref 98–107)
CO2: 22 MMOL/L (ref 20–31)
CREAT SERPL-MCNC: 2.06 MG/DL (ref 0.5–0.9)
CROSSMATCH RESULT: NORMAL
DIFFERENTIAL TYPE: ABNORMAL
DIFFERENTIAL TYPE: ABNORMAL
DISPENSE STATUS BLOOD BANK: NORMAL
EOSINOPHILS RELATIVE PERCENT: 0 %
EOSINOPHILS RELATIVE PERCENT: 0 %
EXPIRATION DATE: NORMAL
FIBRINOGEN: 245 MG/DL (ref 140–420)
GFR AFRICAN AMERICAN: 29 ML/MIN
GFR NON-AFRICAN AMERICAN: 24 ML/MIN
GFR SERPL CREATININE-BSD FRML MDRD: ABNORMAL ML/MIN/{1.73_M2}
GFR SERPL CREATININE-BSD FRML MDRD: ABNORMAL ML/MIN/{1.73_M2}
GLUCOSE BLD-MCNC: 100 MG/DL (ref 65–105)
GLUCOSE BLD-MCNC: 100 MG/DL (ref 70–99)
GLUCOSE BLD-MCNC: 109 MG/DL (ref 65–105)
GLUCOSE BLD-MCNC: 92 MG/DL (ref 65–105)
GLUCOSE BLD-MCNC: 99 MG/DL (ref 65–105)
HCT VFR BLD CALC: 19.7 % (ref 36–46)
HCT VFR BLD CALC: 20.5 % (ref 36–46)
HCT VFR BLD CALC: 24.4 % (ref 36–46)
HEMOGLOBIN: 6.7 G/DL (ref 12–16)
HEMOGLOBIN: 6.8 G/DL (ref 12–16)
HEMOGLOBIN: 8.3 G/DL (ref 12–16)
INR BLD: 1
LYMPHOCYTES # BLD: 6 %
LYMPHOCYTES # BLD: 6 %
MCH RBC QN AUTO: 31.2 PG (ref 26–34)
MCH RBC QN AUTO: 31.2 PG (ref 26–34)
MCHC RBC AUTO-ENTMCNC: 33.3 G/DL (ref 31–37)
MCHC RBC AUTO-ENTMCNC: 34.1 G/DL (ref 31–37)
MCV RBC AUTO: 91.4 FL (ref 80–100)
MCV RBC AUTO: 93.8 FL (ref 80–100)
MONOCYTES # BLD: 7 %
MONOCYTES # BLD: 8 %
PARTIAL THROMBOPLASTIN TIME: 21.3 SEC (ref 21.3–31.3)
PDW BLD-RTO: 14.6 % (ref 12.5–15.4)
PDW BLD-RTO: 14.9 % (ref 12.5–15.4)
PLATELET # BLD: 58 K/UL (ref 140–450)
PLATELET # BLD: 79 K/UL (ref 140–450)
PLATELET ESTIMATE: ABNORMAL
PLATELET ESTIMATE: ABNORMAL
PMV BLD AUTO: 8.2 FL (ref 6–12)
PMV BLD AUTO: 8.9 FL (ref 6–12)
POTASSIUM SERPL-SCNC: 4 MMOL/L (ref 3.7–5.3)
PROTHROMBIN TIME: 10.7 SEC (ref 9.4–12.6)
RBC # BLD: 2.15 M/UL (ref 4–5.2)
RBC # BLD: 2.19 M/UL (ref 4–5.2)
RBC # BLD: ABNORMAL 10*6/UL
RBC # BLD: ABNORMAL 10*6/UL
RETIC %: 3.4 % (ref 0.5–2)
SEG NEUTROPHILS: 86 %
SEG NEUTROPHILS: 87 %
SEGMENTED NEUTROPHILS ABSOLUTE COUNT: 10.5 K/UL (ref 1.8–7.7)
SEGMENTED NEUTROPHILS ABSOLUTE COUNT: 9.9 K/UL (ref 1.8–7.7)
SODIUM BLD-SCNC: 152 MMOL/L (ref 135–144)
SODIUM BLD-SCNC: 153 MMOL/L (ref 135–144)
SODIUM BLD-SCNC: 156 MMOL/L (ref 135–144)
SODIUM BLD-SCNC: 157 MMOL/L (ref 135–144)
SODIUM BLD-SCNC: 158 MMOL/L (ref 135–144)
SODIUM BLD-SCNC: 159 MMOL/L (ref 135–144)
TRANSFUSION STATUS: NORMAL
UNIT DIVISION: 0
UNIT NUMBER: NORMAL
WBC # BLD: 11.5 K/UL (ref 3.5–11)
WBC # BLD: 12.1 K/UL (ref 3.5–11)
WBC # BLD: ABNORMAL 10*3/UL
WBC # BLD: ABNORMAL 10*3/UL

## 2017-10-15 PROCEDURE — 2500000003 HC RX 250 WO HCPCS: Performed by: INTERNAL MEDICINE

## 2017-10-15 PROCEDURE — 2500000003 HC RX 250 WO HCPCS: Performed by: STUDENT IN AN ORGANIZED HEALTH CARE EDUCATION/TRAINING PROGRAM

## 2017-10-15 PROCEDURE — G9168 MEMORY CURRENT STATUS: HCPCS

## 2017-10-15 PROCEDURE — 84295 ASSAY OF SERUM SODIUM: CPT

## 2017-10-15 PROCEDURE — G9186 MOTOR SPEECH GOAL STATUS: HCPCS

## 2017-10-15 PROCEDURE — 94762 N-INVAS EAR/PLS OXIMTRY CONT: CPT

## 2017-10-15 PROCEDURE — 80048 BASIC METABOLIC PNL TOTAL CA: CPT

## 2017-10-15 PROCEDURE — 85025 COMPLETE CBC W/AUTO DIFF WBC: CPT

## 2017-10-15 PROCEDURE — 99232 SBSQ HOSP IP/OBS MODERATE 35: CPT | Performed by: INTERNAL MEDICINE

## 2017-10-15 PROCEDURE — 2000000003 HC NEURO ICU R&B

## 2017-10-15 PROCEDURE — G9159 LANG COMP CURRENT STATUS: HCPCS

## 2017-10-15 PROCEDURE — G9169 MEMORY GOAL STATUS: HCPCS

## 2017-10-15 PROCEDURE — P9016 RBC LEUKOCYTES REDUCED: HCPCS

## 2017-10-15 PROCEDURE — 85014 HEMATOCRIT: CPT

## 2017-10-15 PROCEDURE — G9160 LANG COMP GOAL STATUS: HCPCS

## 2017-10-15 PROCEDURE — 36430 TRANSFUSION BLD/BLD COMPNT: CPT

## 2017-10-15 PROCEDURE — 99232 SBSQ HOSP IP/OBS MODERATE 35: CPT | Performed by: PSYCHIATRY & NEUROLOGY

## 2017-10-15 PROCEDURE — 6360000002 HC RX W HCPCS: Performed by: EMERGENCY MEDICINE

## 2017-10-15 PROCEDURE — 85045 AUTOMATED RETICULOCYTE COUNT: CPT

## 2017-10-15 PROCEDURE — 85384 FIBRINOGEN ACTIVITY: CPT

## 2017-10-15 PROCEDURE — 85018 HEMOGLOBIN: CPT

## 2017-10-15 PROCEDURE — G8999 MOTOR SPEECH CURRENT STATUS: HCPCS

## 2017-10-15 PROCEDURE — 36415 COLL VENOUS BLD VENIPUNCTURE: CPT

## 2017-10-15 PROCEDURE — 86901 BLOOD TYPING SEROLOGIC RH(D): CPT

## 2017-10-15 PROCEDURE — 2580000003 HC RX 258: Performed by: PSYCHIATRY & NEUROLOGY

## 2017-10-15 PROCEDURE — 2580000003 HC RX 258: Performed by: INTERNAL MEDICINE

## 2017-10-15 PROCEDURE — 85730 THROMBOPLASTIN TIME PARTIAL: CPT

## 2017-10-15 PROCEDURE — 82947 ASSAY GLUCOSE BLOOD QUANT: CPT

## 2017-10-15 PROCEDURE — 92523 SPEECH SOUND LANG COMPREHEN: CPT

## 2017-10-15 PROCEDURE — 2580000003 HC RX 258: Performed by: STUDENT IN AN ORGANIZED HEALTH CARE EDUCATION/TRAINING PROGRAM

## 2017-10-15 PROCEDURE — 86850 RBC ANTIBODY SCREEN: CPT

## 2017-10-15 PROCEDURE — 85610 PROTHROMBIN TIME: CPT

## 2017-10-15 PROCEDURE — 86920 COMPATIBILITY TEST SPIN: CPT

## 2017-10-15 PROCEDURE — 99233 SBSQ HOSP IP/OBS HIGH 50: CPT | Performed by: PSYCHIATRY & NEUROLOGY

## 2017-10-15 PROCEDURE — 86900 BLOOD TYPING SEROLOGIC ABO: CPT

## 2017-10-15 PROCEDURE — 6360000002 HC RX W HCPCS: Performed by: STUDENT IN AN ORGANIZED HEALTH CARE EDUCATION/TRAINING PROGRAM

## 2017-10-15 PROCEDURE — 36416 COLLJ CAPILLARY BLOOD SPEC: CPT

## 2017-10-15 RX ORDER — FENTANYL CITRATE 50 UG/ML
25 INJECTION, SOLUTION INTRAMUSCULAR; INTRAVENOUS
Status: DISCONTINUED | OUTPATIENT
Start: 2017-10-15 | End: 2017-10-19 | Stop reason: HOSPADM

## 2017-10-15 RX ORDER — FENTANYL CITRATE 50 UG/ML
50 INJECTION, SOLUTION INTRAMUSCULAR; INTRAVENOUS
Status: DISCONTINUED | OUTPATIENT
Start: 2017-10-15 | End: 2017-10-19 | Stop reason: HOSPADM

## 2017-10-15 RX ORDER — ENALAPRILAT 2.5 MG/2ML
1.25 INJECTION INTRAVENOUS EVERY 6 HOURS PRN
Status: DISCONTINUED | OUTPATIENT
Start: 2017-10-15 | End: 2017-10-17

## 2017-10-15 RX ORDER — 0.9 % SODIUM CHLORIDE 0.9 %
250 INTRAVENOUS SOLUTION INTRAVENOUS ONCE
Status: COMPLETED | OUTPATIENT
Start: 2017-10-15 | End: 2017-10-15

## 2017-10-15 RX ORDER — MORPHINE SULFATE 2 MG/ML
2 INJECTION, SOLUTION INTRAMUSCULAR; INTRAVENOUS ONCE
Status: DISCONTINUED | OUTPATIENT
Start: 2017-10-15 | End: 2017-10-18

## 2017-10-15 RX ORDER — 3% SODIUM CHLORIDE 3 G/100ML
75 INJECTION, SOLUTION INTRAVENOUS CONTINUOUS
Status: ACTIVE | OUTPATIENT
Start: 2017-10-15 | End: 2017-10-16

## 2017-10-15 RX ADMIN — SODIUM CHLORIDE 75 ML/HR: 3 INJECTION, SOLUTION INTRAVENOUS at 02:33

## 2017-10-15 RX ADMIN — METOPROLOL TARTRATE 5 MG: 5 INJECTION INTRAVENOUS at 10:46

## 2017-10-15 RX ADMIN — SODIUM CHLORIDE 75 ML/HR: 3 INJECTION, SOLUTION INTRAVENOUS at 07:53

## 2017-10-15 RX ADMIN — Medication 10 ML: at 22:20

## 2017-10-15 RX ADMIN — METOPROLOL TARTRATE 5 MG: 5 INJECTION INTRAVENOUS at 17:22

## 2017-10-15 RX ADMIN — FENTANYL CITRATE 25 MCG: 50 INJECTION, SOLUTION INTRAMUSCULAR; INTRAVENOUS at 13:29

## 2017-10-15 RX ADMIN — SODIUM CHLORIDE 250 ML: 9 INJECTION, SOLUTION INTRAVENOUS at 08:02

## 2017-10-15 RX ADMIN — METOPROLOL TARTRATE 5 MG: 5 INJECTION INTRAVENOUS at 06:12

## 2017-10-15 RX ADMIN — ESOMEPRAZOLE SODIUM 40 MG: 40 INJECTION INTRAVENOUS at 08:18

## 2017-10-15 RX ADMIN — METOPROLOL TARTRATE 5 MG: 5 INJECTION INTRAVENOUS at 22:19

## 2017-10-15 RX ADMIN — Medication 10 ML: at 22:19

## 2017-10-15 RX ADMIN — CEFTRIAXONE SODIUM 1 G: 1 INJECTION, SOLUTION INTRAVENOUS at 16:07

## 2017-10-15 ASSESSMENT — PAIN SCALES - GENERAL
PAINLEVEL_OUTOF10: 7
PAINLEVEL_OUTOF10: 0

## 2017-10-15 NOTE — PROGRESS NOTES
NEPHROLOGY PROGRESS NOTE      SUBJECTIVE     Pt seen and examined at bedside. On 10/14 code status changed to [facility] and patient to be seen by palliative care. Hypoglycemia yesterday D50 12.5 grams given, and D5% started at 25cc/hr. Sodium 156 this am hypertonic saline held. She has a zamarripa and has about 1180 out past 24 hours    Left arm AVF very faint thrill and no bruit. Right internal jugular temporary dialysis catheter placement on 10/13  Last HD was Friday  's this am  No family present. OBJECTIVE     Vitals:    10/15/17 0512 10/15/17 0605 10/15/17 0705 10/15/17 0738   BP: (!) 160/56 (!) 149/79 (!) 153/55 (!) 153/55   Pulse: 97 98 88 91   Resp: 23 30 25 24   Temp:    98.6 °F (37 °C)   TempSrc:    Oral   SpO2: 99%   99%   Weight:       Height:         24HR INTAKE/OUTPUT:      Intake/Output Summary (Last 24 hours) at 10/15/17 0915  Last data filed at 10/15/17 0700   Gross per 24 hour   Intake             2169 ml   Output             1180 ml   Net              989 ml     General appearance:lethargic, appears comfortable  Respiratory::vesicular breath sounds,no wheeze/crackles  Cardiovascular:S1 S2  Tachycardic, no murmur, rub or gallop  Abdomen:Non tender/non distended. Bowel sounds present  Extremities: No Cyanosis or Clubbing, +R Lower extremity edema  Neurological lethargic, pupils reactive    MEDICATIONS     Scheduled Meds:    sodium chloride  250 mL Intravenous Once    fentanNYL  50 mcg Intravenous Once    esomeprazole  40 mg Intravenous Daily    metoprolol  5 mg Intravenous Q6H    insulin lispro  0-6 Units Subcutaneous TID     insulin lispro  0-3 Units Subcutaneous Nightly    aspirin  81 mg Oral Once    cefTRIAXone (ROCEPHIN) IV  1 g Intravenous Q24H    sodium chloride flush  10 mL Intravenous 2 times per day    sodium chloride flush  10 mL Intravenous 2 times per day    sodium chloride flush  10 mL Intravenous 2 times per day    atorvastatin  40 mg Oral Nightly    0.9 %

## 2017-10-15 NOTE — PROGRESS NOTES
prosepctive can be transferred to the stepdown unit. Plan discussed with Dr Feng Jones.    [address]

## 2017-10-15 NOTE — FLOWSHEET NOTE
Writer spoke with Dr Keyla Forte regarding HGB of 6.7. Order received to transfuse 1 unit PRBC. Writer called pts brother Angela Kc to obtain consent. Verbal consent received over the phone, writer answered all questions and provided emotional support. . Order obtained for right mitt restraint, as pt was observed reaching and pulling at her CVC.  Orders placed and carried out

## 2017-10-15 NOTE — PROGRESS NOTES
Neuro Critical Care Daily Progress Note    Patient Name: Jaqui Lawson  Patient : 1945  Room/Bed: [facility]4424-  Allergies: Allergies   Allergen Reactions    Other      disolvable sutures cause infection       Admission History:  Patient admitted to the ICU after attempted thrombectomy of a R MCA occlusion. Hospital Course:  Stable neurological examination with L sided weakness and left sided neglect. 10/15: Hbg 6.7. Receiving 1 UPRBC. 3% NaCl discontinued. No acute issues per nursing. Pain controlled.  IM wanting CT abdomen but in site of DNR-CCA will recommend against.     Current Medications:  Scheduled Meds:   sodium chloride  250 mL Intravenous Once    fentanNYL  50 mcg Intravenous Once    esomeprazole  40 mg Intravenous Daily    metoprolol  5 mg Intravenous Q6H    insulin lispro  0-6 Units Subcutaneous TID WC    insulin lispro  0-3 Units Subcutaneous Nightly    aspirin  81 mg Oral Once    cefTRIAXone (ROCEPHIN) IV  1 g Intravenous Q24H    sodium chloride flush  10 mL Intravenous 2 times per day    sodium chloride flush  10 mL Intravenous 2 times per day    sodium chloride flush  10 mL Intravenous 2 times per day    atorvastatin  40 mg Oral Nightly    0.9 % sodium chloride  250 mL Intravenous Once    sodium chloride  250 mL Intravenous Once     Continuous Infusions:   sodium chloride      dextrose 25 mL/hr (10/14/17 1715)    phenylephrine (OSITO-SYNEPHRINE) 50mg/250mL infusion Stopped (10/13/17 0130)       Vitals:  Patient Vitals for the past 8 hrs:   BP Temp Temp src Pulse Resp SpO2   10/15/17 0738 (!) 153/55 98.6 °F (37 °C) Oral 91 24 99 %   10/15/17 0705 (!) 153/55 - - 88 25 -   10/15/17 0605 (!) 149/79 - - 98 30 -   10/15/17 0512 (!) 160/56 - - 97 23 99 %   10/15/17 0505 (!) 193/133 - - 91 18 99 %   10/15/17 0405 (!) 169/49 98.6 °F (37 °C) Oral 93 21 100 %   10/15/17 0305 (!) 168/74 - - 92 21 100 %   10/15/17 0205 (!) 156/57 - - 86 20 100 %   10/15/17 0105 (!) 149/57 - areas of disagreement are noted on the chart. I agree with the chief complaint, past medical history, past surgical history, allergies, medications, social and family history as documented unless otherwise noted above. - Patient more awake this am and following commands on the R side. - Continue treatment of cerebral edema with hypertonic saline with a Na goal of 150-155.   - Transfused with 1 U of PRBCs. - Follow H*H.  - Continue monitoring in ICU.   - Neurosurgery consult. Sudeep Pardo [address], Vascular neurology.   [address] [facility]

## 2017-10-15 NOTE — PROGRESS NOTES
Once    esomeprazole  40 mg Intravenous Daily    metoprolol  5 mg Intravenous Q6H    insulin lispro  0-6 Units Subcutaneous TID WC    insulin lispro  0-3 Units Subcutaneous Nightly    aspirin  81 mg Oral Once    cefTRIAXone (ROCEPHIN) IV  1 g Intravenous Q24H    sodium chloride flush  10 mL Intravenous 2 times per day    sodium chloride flush  10 mL Intravenous 2 times per day    sodium chloride flush  10 mL Intravenous 2 times per day    atorvastatin  40 mg Oral Nightly    0.9 % sodium chloride  250 mL Intravenous Once    sodium chloride  250 mL Intravenous Once       Diagnostic Labs   CBC:   Recent Labs      10/14/17   0659  10/15/17   0351  10/15/17   0807   WBC  18.2*  11.5*  12.1*   RBC  2.34*  2.15*  2.19*   HGB  7.3*  6.7*  6.8*   HCT  21.5*  19.7*  20.5*   MCV  92.1  91.4  93.8   RDW  15.0  14.6  14.9   PLT  86*  58*  79*     BMP:   Recent Labs      10/13/17   1047  10/14/17   0659   10/15/17   0012  10/15/17   0351  10/15/17   0613   NA  143  143   < >  153*  152*  156*   K  4.5  4.2   --    --   4.0   --    CL  109*  105   --    --   116*   --    CO2  20  25   --    --   22   --    BUN  71*  41*   --    --   42*   --    CREATININE  3.08*  2.21*   --    --   2.06*   --     < > = values in this interval not displayed. BNP: No results for input(s): BNP in the last 72 hours. PT/INR:   Recent Labs      10/15/17   0807   PROTIME  10.7   INR  1.0     APTT:   Recent Labs      10/15/17   0807   APTT  21.3     CARDIAC ENZYMES:   No results for input(s): CKMB, CKMBINDEX, TROPONINI in the last 72 hours. Invalid input(s): CKTOTAL;3    Imaging    Xr Abdomen (kub) (single Ap View)    Result Date: 10/13/2017    No evidence of bowel obstruction. Ct Head Without Contrast    Result Date: 10/13/2017  1. Evolving right MCA infarct. Increased edema since comparison exam with slight positive mass effect but no significant midline shift. 2. No intracranial hemorrhage.      Ct Head Wo Contrast    Result Date: 10/12/2017    Persistent hyperdense right MCA (M1 segment). Large right MCA distribution acute infarct, newly developed since recent prior study. Xr Chest Portable    Result Date: 10/12/2017    No acute process. Hiatal hernia Right jugular central venous line tip appears to be in the inferior aspect of the right atrium     Xr Chest Portable    Result Date: 10/12/2017    No acute findings. No significant interval change. Cta Neck W Contrast    Addendum Date: 10/12/2017    ADDENDUM: Curved multiplanar reformats performed on an independent workstation have become available. No change to the original report. Result Date: 10/12/2017    Possible moderate stenosis at the origin of the left vertebral artery at the arch of the aorta. Mild plaque in the carotid bulbs, left greater than right, with no significant stenosis. Cta Head W Contrast.     Result Date: 10/12/2017    Complete or near complete occlusion 8 mm segment distal M1 segment right middle cerebral artery territory. Case discussed with Dr. Sugey Maguire the neuro interventionalist at 5:30 p.m.. Sensitivity is limited without post processed and 3D imaging. An addendum will be performed when these are available.        Assessment and Plan:   Principal Problem:    Cerebrovascular accident (CVA) due to thrombosis of right middle cerebral artery ([address]  Active Problems:    HTN (hypertension)    Hep C w/o coma, chronic (HCC)    ESRD on hemodialysis ([address]    Morbid obesity ([address]    Left-sided weakness    Weakness of extremity    Left-sided neglect    Cerebrovascular accident (CVA) due to embolism of right middle cerebral artery (HCC)    Acute cystitis without hematuria    Cerebral edema (HCC)    Acute blood loss anemia    Acute encephalopathy    Thrombocytopenia (HCC)    Morbid obesity, unspecified obesity type ([address]    Lethargy    - medical management is with collaboration with Neuro critical team   - Ceftriaxone for UTI  - encephalopathy is most likely from brain edema  - transfuse 1 RBC. May need CT abd if    - agree with DNR CCA for now  - respect family wishes  - monitor platelets     Discharge planning: Activity: as tolerated  Disposition: SNF  PO intake: Diet NPO Effective Now  Hernandez: Yes    Mustapha Bates, PGY-3, Internal Medicine Resident  [address]  Attending Physician Statement  I have discussed the care of Emily Ramirez, including pertinent history and exam findings,  with the resident. I have reviewed the key elements of all parts of the encounter with the resident. I agree with the assessment, plan and orders as documented by the resident. Pt seen discussed and examined. (GC Modifier) . Appears somewhat alert, arousable. Some response to command. Afeb. Hypertensive. Pt/family have opted for [facility] status.

## 2017-10-15 NOTE — PROGRESS NOTES
sulci are otherwise unremarkable. There is mild diffuse white matter hypodensity. Corticomedullary junction is preserved. There is no intracranial hemorrhage. There is no extra-axial fluid. There is mild diffuse atrophy. ORBITS: The visualized portion of the orbits demonstrate no acute abnormality. SINUSES: The visualized paranasal sinuses and mastoid air cells demonstrate no acute abnormality. SOFT TISSUES/SKULL:  No acute abnormality of the visualized skull or soft tissues. Hyperdense MCA sign on the right. The findings were sent to the Radiology Results [address] at 5:26 pm on 10/11/2017to be communicated to a licensed caregiver. Case discussed with Dr. Francesca Cárdenas the neuro interventionalist at 5:30 p.m. Duane Bourdon Xr Chest Portable    Result Date: 10/12/2017  EXAMINATION: SINGLE VIEW OF THE CHEST 10/12/2017 3:45 pm COMPARISON: 10/12/2017 at 13:21 HISTORY: ORDERING SYSTEM PROVIDED HISTORY: line placement TECHNOLOGIST PROVIDED HISTORY: Reason for exam:->line placement FINDINGS: The right jugular central venous catheter has been withdrawn and now terminates near the junction of the SVC and the right atrium. The lungs are without acute focal process. There is no effusion or pneumothorax. The cardiomediastinal silhouette is stable. The osseous structures are stable. No acute process. Right jugular central venous catheter has been repositioned and now terminates near the junction of the SVC and the right atrium     Xr Chest Portable    Result Date: 10/12/2017  EXAMINATION: SINGLE VIEW OF THE CHEST 10/12/2017 1:33 pm COMPARISON: 10/12/2017 at 9:46 a.m. HISTORY: ORDERING SYSTEM PROVIDED HISTORY: line placement TECHNOLOGIST PROVIDED HISTORY: Reason for exam:->line placement Initial evaluation FINDINGS: Right jugular central venous line is in place with its tip projecting in the inferior aspect of the right atrium. The lungs are without acute focal process. There is no effusion or pneumothorax.  The

## 2017-10-15 NOTE — PROGRESS NOTES
[facility] - Initial Consult Note  Today's Date and Time: 10/15/2017, 6:32 PM    Impression :   1. Sepsis in the setting of 3/4 SIRS criteria. -secondary to UTI   · Previous history of UTI + citrobacter freundii on 10-1-17  2. Occlusion of the Rt MCA s/p tPA and unsuccessful thrombectomy. · With brain edema/metabolic encephalopathy - worsening edema. 3. ESRD on HD but makes urine  4. Anemia -Fluctuations in Hgb   · Concern for GI bleed vs retroperitoneal bleeding. 5. Morbid obesity  6. HLD  7. HTN    Recommendations     · Continue Rocephin. Day 3  For citrobacter UTI with sepsis  · leukocytosis and sepsis resolving  · would benefit from US renal look for hydronephrosis. Chief complaint/reason for consultation:   Sepsis with no source. History of Present Illness:     INITIAL HISTORY 10-13-17:    Wm Tobar is a 67y.o.-year-old  female who was initially admitted on 10/11/2017. Patient seen at the request of Dr. Ignacio Hunt.    Patient has a past medical history of ESRD on HD, morbid obesity, Hepatitis C, and HTN. Patient was brought to the emergency room after falling out of her wheelchair on 10-11-17. Stroke team evaluated patient and noticed left facial droop, left hemiparesis, and right sided gaze preference. NIH around 10. Underwent imaging and noted to have MCA occlusion and tPA was administered. Patient also underwent attempted M1 MCA thrombectomy which was unsuccessful. Infectious disease is consulted for sepsis with no identifiable source. Patient last 24-48 hours has remained tachycardic 100-130's & tachypneic. Afebrile. WBC count upon admission was 6.9 which has trended up to 34.5 yesterday. Blood cultures drawn yesterday and no growth 17 hours. UA 10-12-17 shows moderate LE, few bacteria, and numerous WBC. Urine culture in progress. Patient had a recent urine culture on 10-1-17 that grew citrobacter freundii. KUB shows no signs of obstruction.  CXR edema and increased leftward midline shift of 8 mm.       Hyperdense right MCA consistent with thromboembolic disease. CT head 10-12-17  Impression   1. Evolving right MCA infarct.  Increased edema since comparison exam with   slight positive mass effect but no significant midline shift. 2. No intracranial hemorrhage.         KUB 10-12-17  Impression   No evidence of bowel obstruction. CXR 10-12-17  Impression   No acute process.       Right jugular central venous catheter has been repositioned and now   terminates near the junction of the SVC and the right atrium         CXR 10-12-17 1st   Impression   No acute process.       Hiatal hernia       Right jugular central venous line tip appears to be in the inferior aspect of   the right atrium             10/13/2017 11:52 PM - Erasmo, [facility] Incoming Lab Results From [facility]     Component Results     Component Collected Lab   Specimen Description 10/12/2017  1:50  [address]   . CLEAN CATCH URINE    Special Requests 10/12/2017  1:50  [address]   NOT REPORTED    Culture  (Abnormal) 10/12/2017  1:50 PM [address] 10 to 50,000 CFU/ML     Culture 10/12/2017  1:50 PM 3 [address] [phone]    Status 10/12/2017  1:50  [address]   FINAL 10/13/2017    Organism 10/12/2017  1:50 PM [address] Performed By     ScionHealthTerri Hood Name Director Address Valid Date Range   [address] [address] 08/30/17 1201-Present   Culture & Susceptibility     CITROBACTER FREUNDII     Antibiotic Interpretation CHLOE Status   amikacin  NOT REPORTED Final   aztreonam Sensitive <=1 SUSCEPTIBLE Final   ceFAZolin  NOT REPORTED Final   cefTRIAXone Sensitive <=1 SUSCEPTIBLE Final   cefepime  NOT REPORTED Final ciprofloxacin Sensitive <=0.25 SUSCEPTIBLE Final   ertapenem  NOT REPORTED Final   gentamicin Sensitive <=1 SUSCEPTIBLE Final   meropenem  NOT REPORTED Final   nitrofurantoin Sensitive <=16 SUSCEPTIBLE Final   piperacillin-tazobactam Sensitive <=4 SUSCEPTIBLE Final   tigecycline  NOT REPORTED Final   tobramycin Sensitive <=1 SUSCEPTIBLE Final   trimethoprim-sulfamethoxazole Sensitive <=20 SUSCEPTIBLE Final          Thank you for allowing us to participate in the care of this patient. Please call with questions.     Miriam Jesus MD, MS4

## 2017-10-16 ENCOUNTER — APPOINTMENT (OUTPATIENT)
Dept: CT IMAGING | Age: 72
DRG: 023 | End: 2017-10-16
Payer: COMMERCIAL

## 2017-10-16 LAB
ABO/RH: NORMAL
ABSOLUTE EOS #: 0.1 K/UL (ref 0–0.4)
ABSOLUTE LYMPH #: 0.9 K/UL (ref 1–4.8)
ABSOLUTE MONO #: 1 K/UL (ref 0.1–1.2)
ANION GAP SERPL CALCULATED.3IONS-SCNC: 12 MMOL/L (ref 9–17)
ANTIBODY SCREEN: NEGATIVE
ARM BAND NUMBER: NORMAL
BASOPHILS # BLD: 0 %
BASOPHILS ABSOLUTE: 0 K/UL (ref 0–0.2)
BLD PROD TYP BPU: NORMAL
BUN BLDV-MCNC: 46 MG/DL (ref 8–23)
BUN/CREAT BLD: ABNORMAL (ref 9–20)
CALCIUM SERPL-MCNC: 7.9 MG/DL (ref 8.6–10.4)
CHLORIDE BLD-SCNC: 122 MMOL/L (ref 98–107)
CO2: 24 MMOL/L (ref 20–31)
CREAT SERPL-MCNC: 2.2 MG/DL (ref 0.5–0.9)
CROSSMATCH RESULT: NORMAL
DIFFERENTIAL TYPE: ABNORMAL
DISPENSE STATUS BLOOD BANK: NORMAL
EOSINOPHILS RELATIVE PERCENT: 1 %
EXPIRATION DATE: NORMAL
GFR AFRICAN AMERICAN: 27 ML/MIN
GFR NON-AFRICAN AMERICAN: 22 ML/MIN
GFR SERPL CREATININE-BSD FRML MDRD: ABNORMAL ML/MIN/{1.73_M2}
GFR SERPL CREATININE-BSD FRML MDRD: ABNORMAL ML/MIN/{1.73_M2}
GLUCOSE BLD-MCNC: 100 MG/DL (ref 65–105)
GLUCOSE BLD-MCNC: 110 MG/DL (ref 65–105)
GLUCOSE BLD-MCNC: 89 MG/DL (ref 65–105)
GLUCOSE BLD-MCNC: 92 MG/DL (ref 70–99)
HCT VFR BLD CALC: 24.5 % (ref 36–46)
HEMOGLOBIN: 8.3 G/DL (ref 12–16)
LYMPHOCYTES # BLD: 8 %
MCH RBC QN AUTO: 31.2 PG (ref 26–34)
MCHC RBC AUTO-ENTMCNC: 34 G/DL (ref 31–37)
MCV RBC AUTO: 91.7 FL (ref 80–100)
MONOCYTES # BLD: 8 %
PDW BLD-RTO: 15.5 % (ref 12.5–15.4)
PLATELET # BLD: 87 K/UL (ref 140–450)
PLATELET ESTIMATE: ABNORMAL
PMV BLD AUTO: 8.8 FL (ref 6–12)
POTASSIUM SERPL-SCNC: 3.9 MMOL/L (ref 3.7–5.3)
RBC # BLD: 2.68 M/UL (ref 4–5.2)
RBC # BLD: ABNORMAL 10*6/UL
SEG NEUTROPHILS: 83 %
SEGMENTED NEUTROPHILS ABSOLUTE COUNT: 9.3 K/UL (ref 1.8–7.7)
SODIUM BLD-SCNC: 156 MMOL/L (ref 135–144)
SODIUM BLD-SCNC: 158 MMOL/L (ref 135–144)
SODIUM BLD-SCNC: 160 MMOL/L (ref 135–144)
SODIUM BLD-SCNC: 160 MMOL/L (ref 135–144)
SURGICAL PATHOLOGY REPORT: NORMAL
TRANSFUSION STATUS: NORMAL
UNIT DIVISION: 0
UNIT NUMBER: NORMAL
WBC # BLD: 11.3 K/UL (ref 3.5–11)
WBC # BLD: ABNORMAL 10*3/UL

## 2017-10-16 PROCEDURE — 2580000003 HC RX 258: Performed by: INTERNAL MEDICINE

## 2017-10-16 PROCEDURE — 2500000003 HC RX 250 WO HCPCS: Performed by: STUDENT IN AN ORGANIZED HEALTH CARE EDUCATION/TRAINING PROGRAM

## 2017-10-16 PROCEDURE — 99232 SBSQ HOSP IP/OBS MODERATE 35: CPT | Performed by: PSYCHIATRY & NEUROLOGY

## 2017-10-16 PROCEDURE — 99231 SBSQ HOSP IP/OBS SF/LOW 25: CPT | Performed by: INTERNAL MEDICINE

## 2017-10-16 PROCEDURE — 85025 COMPLETE CBC W/AUTO DIFF WBC: CPT

## 2017-10-16 PROCEDURE — 94762 N-INVAS EAR/PLS OXIMTRY CONT: CPT

## 2017-10-16 PROCEDURE — 2500000003 HC RX 250 WO HCPCS: Performed by: INTERNAL MEDICINE

## 2017-10-16 PROCEDURE — 80048 BASIC METABOLIC PNL TOTAL CA: CPT

## 2017-10-16 PROCEDURE — 82947 ASSAY GLUCOSE BLOOD QUANT: CPT

## 2017-10-16 PROCEDURE — 97110 THERAPEUTIC EXERCISES: CPT

## 2017-10-16 PROCEDURE — 70450 CT HEAD/BRAIN W/O DYE: CPT

## 2017-10-16 PROCEDURE — 36415 COLL VENOUS BLD VENIPUNCTURE: CPT

## 2017-10-16 PROCEDURE — 6360000002 HC RX W HCPCS: Performed by: STUDENT IN AN ORGANIZED HEALTH CARE EDUCATION/TRAINING PROGRAM

## 2017-10-16 PROCEDURE — 2500000003 HC RX 250 WO HCPCS: Performed by: EMERGENCY MEDICINE

## 2017-10-16 PROCEDURE — 99233 SBSQ HOSP IP/OBS HIGH 50: CPT | Performed by: PSYCHIATRY & NEUROLOGY

## 2017-10-16 PROCEDURE — 2000000003 HC NEURO ICU R&B

## 2017-10-16 PROCEDURE — 84295 ASSAY OF SERUM SODIUM: CPT

## 2017-10-16 PROCEDURE — 6360000002 HC RX W HCPCS: Performed by: EMERGENCY MEDICINE

## 2017-10-16 PROCEDURE — 2580000003 HC RX 258: Performed by: EMERGENCY MEDICINE

## 2017-10-16 RX ORDER — LABETALOL HYDROCHLORIDE 5 MG/ML
10 INJECTION, SOLUTION INTRAVENOUS
Status: DISCONTINUED | OUTPATIENT
Start: 2017-10-16 | End: 2017-10-18

## 2017-10-16 RX ORDER — LABETALOL HYDROCHLORIDE 5 MG/ML
10 INJECTION, SOLUTION INTRAVENOUS EVERY 4 HOURS PRN
Status: DISCONTINUED | OUTPATIENT
Start: 2017-10-16 | End: 2017-10-16

## 2017-10-16 RX ORDER — AMLODIPINE BESYLATE 10 MG/1
10 TABLET ORAL DAILY
Status: DISCONTINUED | OUTPATIENT
Start: 2017-10-16 | End: 2017-10-18

## 2017-10-16 RX ORDER — SODIUM CHLORIDE 9 MG/ML
INJECTION, SOLUTION INTRAVENOUS CONTINUOUS
Status: CANCELLED | OUTPATIENT
Start: 2017-10-16

## 2017-10-16 RX ADMIN — Medication 10 ML: at 20:49

## 2017-10-16 RX ADMIN — LABETALOL HYDROCHLORIDE 10 MG: 5 INJECTION, SOLUTION INTRAVENOUS at 23:03

## 2017-10-16 RX ADMIN — LABETALOL HYDROCHLORIDE 10 MG: 5 INJECTION, SOLUTION INTRAVENOUS at 23:43

## 2017-10-16 RX ADMIN — METOPROLOL TARTRATE 5 MG: 5 INJECTION INTRAVENOUS at 17:31

## 2017-10-16 RX ADMIN — METOPROLOL TARTRATE 5 MG: 5 INJECTION INTRAVENOUS at 06:24

## 2017-10-16 RX ADMIN — LABETALOL HYDROCHLORIDE 10 MG: 5 INJECTION, SOLUTION INTRAVENOUS at 21:34

## 2017-10-16 RX ADMIN — LABETALOL HYDROCHLORIDE 10 MG: 5 INJECTION, SOLUTION INTRAVENOUS at 22:07

## 2017-10-16 RX ADMIN — ESOMEPRAZOLE SODIUM 40 MG: 40 INJECTION INTRAVENOUS at 08:01

## 2017-10-16 RX ADMIN — Medication 10 ML: at 08:01

## 2017-10-16 RX ADMIN — LABETALOL HYDROCHLORIDE 10 MG: 5 INJECTION, SOLUTION INTRAVENOUS at 21:59

## 2017-10-16 RX ADMIN — Medication 10 ML: at 09:00

## 2017-10-16 RX ADMIN — Medication 10 ML: at 20:48

## 2017-10-16 RX ADMIN — FENTANYL CITRATE 25 MCG: 50 INJECTION, SOLUTION INTRAMUSCULAR; INTRAVENOUS at 17:30

## 2017-10-16 RX ADMIN — METOPROLOL TARTRATE 5 MG: 5 INJECTION INTRAVENOUS at 11:06

## 2017-10-16 RX ADMIN — ENALAPRILAT 1.25 MG: 1.25 INJECTION INTRAVENOUS at 10:45

## 2017-10-16 RX ADMIN — ENALAPRILAT 1.25 MG: 1.25 INJECTION INTRAVENOUS at 17:31

## 2017-10-16 RX ADMIN — ENALAPRILAT 1.25 MG: 1.25 INJECTION INTRAVENOUS at 03:02

## 2017-10-16 RX ADMIN — LABETALOL HYDROCHLORIDE 10 MG: 5 INJECTION, SOLUTION INTRAVENOUS at 22:32

## 2017-10-16 RX ADMIN — LABETALOL HYDROCHLORIDE 10 MG: 5 INJECTION, SOLUTION INTRAVENOUS at 20:49

## 2017-10-16 RX ADMIN — METOPROLOL TARTRATE 5 MG: 5 INJECTION INTRAVENOUS at 20:47

## 2017-10-16 RX ADMIN — CEFTRIAXONE SODIUM 1 G: 1 INJECTION, SOLUTION INTRAVENOUS at 16:35

## 2017-10-16 ASSESSMENT — PAIN DESCRIPTION - ORIENTATION: ORIENTATION: LEFT

## 2017-10-16 ASSESSMENT — PAIN SCALES - GENERAL
PAINLEVEL_OUTOF10: 0
PAINLEVEL_OUTOF10: 0
PAINLEVEL_OUTOF10: 5
PAINLEVEL_OUTOF10: 0

## 2017-10-16 ASSESSMENT — PAIN DESCRIPTION - LOCATION: LOCATION: SHOULDER

## 2017-10-16 ASSESSMENT — PAIN SCALES - WONG BAKER: WONGBAKER_NUMERICALRESPONSE: 4

## 2017-10-16 NOTE — CONSULTS
Palliative Care Inpatient Consult    NAME:  Kenny Owens RECORD NUMBER:  [id number]  AGE: 67 y.o. GENDER: female  : 1945  TODAY'S DATE:  10/16/2017    Reasons for Consultation:    Symptom and/or pain management  Provision of information regarding PC and/or hospice philosophies  Complex, time-intensive communication and interdisciplinary psychosocial support  Clarification of goals of care and/or assistance with difficult decision-making  Guidance in regards to resources and transition(s)    Members of PC team contributing to this consultation are :  Felisha Galarza Fellow  Dr. Maribeth Rivero Attending  History of Present Illness   Ms. Faisal Ledezma is a 67year old lady with a history of Hypertension, ESRD, morbid obesity and chronic hepatitis C who presented with left-sided weakness on 10/11/17. CT-brain revealed an acute right MCA stroke. Amber Deluca did receive tPA upon admission. Right MCA thrombectomy was then attempted but was unsuccessful. Admission course has since been complicated by worsening cerebral edema, and sepsis due to UTI. Due to worsening neurologic status, Dr. Luz Waldrop spoke with Amber Deluca' family on 10/14 where he admitted to her poor neurologic prognosis, and her being a poor candidate for surgery. Amber Deluca was then made DNR-CCA. The palliative care team was consulted for evaluation for goals of goals of care.     Referred to Palliative Care by   [x] Physician   [] Nursing  [] Family Request   [] Other:      -Met with alba Gomez today  Delio Patricio shared with me her knowledge of Amber Deluca' stroke and that fact that she's very ill  -I confirmed that Amber Deluca was quite ill, and that based on what the medical teams involved in her care have been saying, there is no cure for her acute stroke and its complications.  Delio Patricio then asked me about hospice-I replied by saying that based on the severity of her stroke and her poor neurologic recovery, that she would indeed Medication Sig Dispense Refill    ondansetron (ZOFRAN ODT) 4 MG disintegrating tablet Take 1 tablet by mouth every 8 hours as needed for Nausea or Vomiting 10 tablet 0    Calcium Carbonate-Vit D-Min (CALCIUM 1200 PO) Take 1 capsule by mouth daily      phosphorus (K PHOS NEUTRAL) 139-129-518 MG tablet Take 1 tablet by mouth daily      Cholecalciferol (VITAMIN D) 2000 UNITS TABS tablet Take 1 tablet by mouth daily 30 tablet 3    B Complex-C-Folic Acid (NEPHRO-GEOVANNI) 0.8 MG TABS Take 1 tablet by mouth daily 30 tablet 3       Data         BP (!) 202/88 Comment: vasotec 1.25mg administered IV  Pulse 86   Temp 97.9 °F (36.6 °C) (Axillary)   Resp 21   Ht 5' 5\" (1.651 m)   Wt 269 lb 2.9 oz (122.1 kg)   LMP  (LMP Unknown) Comment: post menopausal  SpO2 100%   BMI 44.79 kg/m²     Wt Readings from Last 3 Encounters:   10/13/17 269 lb 2.9 oz (122.1 kg)   05/17/17 250 lb (113.4 kg)   10/24/16 274 lb (124.3 kg)        Code Status: DNR-CCA     ADVANCED CARE PLANNING:  Patient has capacity for medical decisions: no  Health Care Power of : Uncertain, but DPOA for healthcare may be brother Oeflia Morrissey  Living Will: not asked     Personal, Social, and Family History  Marital Status:   Living situation:Not asked  Importance of maya/Jainism/spiritual beliefs: ? Very  Psychological Distress: Unable to determine    Assessment        REVIEW OF SYSTEMS  Unable to determine    PHYSICAL ASSESSMENT:  General appearance - Awake but aphasic  Chest - clear to auscultation, no wheezes, rales or rhonchi, symmetric air entry  Heart - normal rate, regular rhythm, normal S1, S2, no murmurs, rubs, clicks or gallops  Abdomen - soft, nontender, nondistended, no masses or organomegaly  Neurological - Left hemiparesis  Extremities - Nil edema    Palliative Performance Scale:  ___60%  Ambulation reduced; Significant disease; Can't do hobbies/housework; intake normal or reduced; occasional assist; LOC full/confusion  ___50%  Mainly

## 2017-10-16 NOTE — PROGRESS NOTES
No  Subjective   General  Family / Caregiver Present: niece  Pain Screening  Patient Currently in Pain: Yes  Pain Assessment  Pain Assessment: Faces  Chew-Baker Pain Rating: Hurts little more  Pain Location: Shoulder  Pain Orientation: Left  Pt grimaced with left shoulder PROM. Orientation  Orientation  Overall Orientation Status: Impaired  Orientation Level: Unable to assess  No verbal communication this date. Objective   Bed mobility  Rolling to Left: Dependent/Total  Rolling to Right: Dependent/Total   PROM with stretch to LUE/BLE x 10 reps  AAROM RUE x 10 reps. Bilateral gastroc stretch x 3 reps  LLE extensor tone noted. Niece to meet with Palliative Care today. Will follow for families decision. Assessment   Body structures, Functions, Activity limitations: Decreased functional mobility ; Decreased ROM; Decreased strength;Decreased endurance;Decreased cognition  Prognosis: Fair  Patient Education: Niece: PT POC  REQUIRES PT FOLLOW UP: Yes  Activity Tolerance  Activity Tolerance: Patient limited by cognitive status; Patient Tolerated treatment well  Activity Tolerance: No change in vitals       Discharge Recommendations:  Continue to assess pending progress, Subacute/Skilled Nursing Facility  Goals  Short term goals  Time Frame for Short term goals: 14 visits  Short term goal 1: Prevent contractures through ROM and stretching  Short term goal 2: Pt to follow most commands for AROM exercises RUE/LE. Short term goal 3: Pt to sit EOB Max A. Short term goal 4: Progress to out of bed mobility as appropriate.   Patient Goals   Patient goals : Unable to state    Plan    Plan  Times per week: 3-5x/week  Current Treatment Recommendations: Strengthening, ROM, Functional Mobility Training, Cognitive Reorientation  Safety Devices  Type of devices: Nurse notified, Left in bed     Therapy Time   Individual Concurrent Group Co-treatment   Time In 1010         Time Out 1035         Minutes 25         Timed Code Treatment Minutes: Jesica Chance [address] [place] [address],

## 2017-10-16 NOTE — PROGRESS NOTES
NEUROLOGY INPATIENT CONSULT NOTE    10/16/2017         Marcy Frye is a  67 y.o. female admitted on 10/11/2017 with  Acute left-sided weakness [M62.89]  Chart reviewed. Discussed with caregivers. No acute issues overnight. She is alert and following simple commands but nonverbal. No witnessed seizure activity. Briefly, this is a  67 y.o. female with hx of HTN, scleroderma, ESRD, on hemodialysis was admitted on 10/11/2017 with Lt hemiparesis and Rt gaze preference found to have dense Rt MCA sign on CT head and Rt M1 MCA occlusion  has had attempted and unsuccessful Rt M1 thrombectomy on admit. No current facility-administered medications on file prior to encounter. Current Outpatient Prescriptions on File Prior to Encounter   Medication Sig Dispense Refill    ondansetron (ZOFRAN ODT) 4 MG disintegrating tablet Take 1 tablet by mouth every 8 hours as needed for Nausea or Vomiting 10 tablet 0    Calcium Carbonate-Vit D-Min (CALCIUM 1200 PO) Take 1 capsule by mouth daily      phosphorus (K PHOS NEUTRAL) [phone] MG tablet Take 1 tablet by mouth daily      Cholecalciferol (VITAMIN D) 2000 UNITS TABS tablet Take 1 tablet by mouth daily 30 tablet 3    B Complex-C-Folic Acid (NEPHRO-GEOVANNI) 0.8 MG TABS Take 1 tablet by mouth daily 30 tablet 3     Allergies: Marcy Frye is allergic to other. Past Medical History:   Diagnosis Date    Arthritis     Blood circulation, collateral     legs    Cancer ([facility]     uterine.   Hysterectomy in 1978    Chronic kidney disease 2016    Hemodialysis patient ([facility] [facility]     Maddi Fail on [place] tues, thurs, sat    History of blood transfusion     Hypertension     Liver disease     Hepatitis C    MRSA (methicillin resistant staph aureus) culture positive 10/11/2017    britton    Neuromuscular disorder ([facility] [facility]     Scleroderma       Past Surgical History:   Procedure Laterality Date    CHOLECYSTECTOMY      DIALYSIS FISTULA CREATION      HYSTERECTOMY     

## 2017-10-16 NOTE — PROGRESS NOTES
Protein (g): 75-95 gm protein    Estimated Intake vs Estimated Needs: Intake Less Than Needs    Nutrition Risk Level: High    Nutrition Interventions:   Continue NPO - Monitor for start of oral diet as appropriate vs need for nutrition support. Continued Inpatient Monitoring, Education Not Indicated, Speech Therapy, Swallow Evaluation    Nutrition Evaluation:   · Evaluation: Goals set   · Goals: Meet % of estimated nutrient needs. · Monitoring: NPO Status, Chewing/Swallowing, Fluid Balance, Mental Status/Confusion, Weight, Pertinent Labs    See Adult Nutrition Doc Flowsheet for more detail.      Electronically signed by Bao Rhodes RD, LD on 10/16/17 at 12:21 PM    Contact Number: [phone]

## 2017-10-16 NOTE — PROGRESS NOTES
140-180 -added Vasotec IV 1.25 mg prn  - Ceftriaxone for UTI day 5  - CBC/BMP Daily  - agree with DNR CCA for now  - respect family wishes  - monitor platelets     Discharge planning: Activity: as tolerated  Disposition: SNF  PO intake: Diet NPO Effective Now  Hernandez: Yes  Attending Physician Statement  I have discussed the care of Chuckie De Jesus, including pertinent history and exam findings,  with the resident. I have reviewed the key elements of all parts of the encounter with the resident. I agree with the assessment, plan and orders as documented by the resident. Pt seen discussed and examined. [facility] Modifier) May be less responsive. Afebrile. Hypertension uncontrolled. Family notes that they may be interested in hospice care but will discuss further among themselves and notify us.

## 2017-10-16 NOTE — PROGRESS NOTES
81 mg daily and lipitor 40 mg daily. SBP goal <160  Management per neuro ICU team.   Patient from neuroendovascular prosepctive can be transferred to the stepdown unit. DNR- CCA , Goals of care discussion pending. Plan to be discussed with Dr John Chowdhury.    Jenae Alarcon MD  Neuroendovascular Fellow  Stroke, [facility]  [address]

## 2017-10-17 ENCOUNTER — HOSPITAL ENCOUNTER (INPATIENT)
Dept: INTERVENTIONAL RADIOLOGY/VASCULAR | Age: 72
Discharge: HOME | DRG: 023 | End: 2017-10-17
Payer: COMMERCIAL

## 2017-10-17 LAB
ABSOLUTE EOS #: 0.1 K/UL (ref 0–0.4)
ABSOLUTE LYMPH #: 0.9 K/UL (ref 1–4.8)
ABSOLUTE MONO #: 1.1 K/UL (ref 0.1–1.2)
ANION GAP SERPL CALCULATED.3IONS-SCNC: 15 MMOL/L (ref 9–17)
BASOPHILS # BLD: 0 %
BASOPHILS ABSOLUTE: 0 K/UL (ref 0–0.2)
BUN BLDV-MCNC: 52 MG/DL (ref 8–23)
BUN/CREAT BLD: ABNORMAL (ref 9–20)
CALCIUM SERPL-MCNC: 8.1 MG/DL (ref 8.6–10.4)
CHLORIDE BLD-SCNC: 123 MMOL/L (ref 98–107)
CO2: 20 MMOL/L (ref 20–31)
CREAT SERPL-MCNC: 2.12 MG/DL (ref 0.5–0.9)
DIFFERENTIAL TYPE: ABNORMAL
EOSINOPHILS RELATIVE PERCENT: 1 %
GFR AFRICAN AMERICAN: 28 ML/MIN
GFR NON-AFRICAN AMERICAN: 23 ML/MIN
GFR SERPL CREATININE-BSD FRML MDRD: ABNORMAL ML/MIN/{1.73_M2}
GFR SERPL CREATININE-BSD FRML MDRD: ABNORMAL ML/MIN/{1.73_M2}
GLUCOSE BLD-MCNC: 101 MG/DL (ref 65–105)
GLUCOSE BLD-MCNC: 104 MG/DL (ref 65–105)
GLUCOSE BLD-MCNC: 111 MG/DL (ref 70–99)
GLUCOSE BLD-MCNC: 97 MG/DL (ref 65–105)
HCT VFR BLD CALC: 26.8 % (ref 36–46)
HEMOGLOBIN: 8.5 G/DL (ref 12–16)
LYMPHOCYTES # BLD: 8 %
MCH RBC QN AUTO: 30.6 PG (ref 26–34)
MCHC RBC AUTO-ENTMCNC: 32 G/DL (ref 31–37)
MCV RBC AUTO: 95.8 FL (ref 80–100)
MONOCYTES # BLD: 10 %
PATHOLOGIST REVIEW: NORMAL
PDW BLD-RTO: 16.2 % (ref 12.5–15.4)
PLATELET # BLD: 104 K/UL (ref 140–450)
PLATELET ESTIMATE: ABNORMAL
PMV BLD AUTO: 8.1 FL (ref 6–12)
POTASSIUM SERPL-SCNC: 4 MMOL/L (ref 3.7–5.3)
RBC # BLD: 2.79 M/UL (ref 4–5.2)
RBC # BLD: ABNORMAL 10*6/UL
SEG NEUTROPHILS: 81 %
SEGMENTED NEUTROPHILS ABSOLUTE COUNT: 9.3 K/UL (ref 1.8–7.7)
SODIUM BLD-SCNC: 156 MMOL/L (ref 135–144)
SODIUM BLD-SCNC: 158 MMOL/L (ref 135–144)
SODIUM BLD-SCNC: 158 MMOL/L (ref 135–144)
SODIUM BLD-SCNC: 159 MMOL/L (ref 135–144)
SODIUM BLD-SCNC: 160 MMOL/L (ref 135–144)
WBC # BLD: 11.4 K/UL (ref 3.5–11)
WBC # BLD: ABNORMAL 10*3/UL

## 2017-10-17 PROCEDURE — 94762 N-INVAS EAR/PLS OXIMTRY CONT: CPT

## 2017-10-17 PROCEDURE — 84295 ASSAY OF SERUM SODIUM: CPT

## 2017-10-17 PROCEDURE — 6360000002 HC RX W HCPCS: Performed by: EMERGENCY MEDICINE

## 2017-10-17 PROCEDURE — 6360000002 HC RX W HCPCS: Performed by: STUDENT IN AN ORGANIZED HEALTH CARE EDUCATION/TRAINING PROGRAM

## 2017-10-17 PROCEDURE — 2000000003 HC NEURO ICU R&B

## 2017-10-17 PROCEDURE — 2580000003 HC RX 258: Performed by: EMERGENCY MEDICINE

## 2017-10-17 PROCEDURE — 2580000003 HC RX 258: Performed by: INTERNAL MEDICINE

## 2017-10-17 PROCEDURE — 97110 THERAPEUTIC EXERCISES: CPT

## 2017-10-17 PROCEDURE — 99223 1ST HOSP IP/OBS HIGH 75: CPT | Performed by: INTERNAL MEDICINE

## 2017-10-17 PROCEDURE — 99232 SBSQ HOSP IP/OBS MODERATE 35: CPT | Performed by: PSYCHIATRY & NEUROLOGY

## 2017-10-17 PROCEDURE — 99233 SBSQ HOSP IP/OBS HIGH 50: CPT | Performed by: PSYCHIATRY & NEUROLOGY

## 2017-10-17 PROCEDURE — 2500000003 HC RX 250 WO HCPCS: Performed by: INTERNAL MEDICINE

## 2017-10-17 PROCEDURE — 36415 COLL VENOUS BLD VENIPUNCTURE: CPT

## 2017-10-17 PROCEDURE — 99232 SBSQ HOSP IP/OBS MODERATE 35: CPT | Performed by: INTERNAL MEDICINE

## 2017-10-17 PROCEDURE — 85025 COMPLETE CBC W/AUTO DIFF WBC: CPT

## 2017-10-17 PROCEDURE — 80048 BASIC METABOLIC PNL TOTAL CA: CPT

## 2017-10-17 PROCEDURE — 2500000003 HC RX 250 WO HCPCS: Performed by: STUDENT IN AN ORGANIZED HEALTH CARE EDUCATION/TRAINING PROGRAM

## 2017-10-17 PROCEDURE — 82947 ASSAY GLUCOSE BLOOD QUANT: CPT

## 2017-10-17 RX ORDER — ENALAPRILAT 2.5 MG/2ML
1.25 INJECTION INTRAVENOUS EVERY 6 HOURS SCHEDULED
Status: DISCONTINUED | OUTPATIENT
Start: 2017-10-17 | End: 2017-10-18

## 2017-10-17 RX ADMIN — METOPROLOL TARTRATE 5 MG: 5 INJECTION INTRAVENOUS at 11:36

## 2017-10-17 RX ADMIN — FENTANYL CITRATE 50 MCG: 50 INJECTION, SOLUTION INTRAMUSCULAR; INTRAVENOUS at 23:44

## 2017-10-17 RX ADMIN — Medication 10 ML: at 21:19

## 2017-10-17 RX ADMIN — METOPROLOL TARTRATE 5 MG: 5 INJECTION INTRAVENOUS at 17:00

## 2017-10-17 RX ADMIN — METOPROLOL TARTRATE 5 MG: 5 INJECTION INTRAVENOUS at 05:07

## 2017-10-17 RX ADMIN — LABETALOL HYDROCHLORIDE 10 MG: 5 INJECTION, SOLUTION INTRAVENOUS at 00:25

## 2017-10-17 RX ADMIN — FENTANYL CITRATE 50 MCG: 50 INJECTION, SOLUTION INTRAMUSCULAR; INTRAVENOUS at 03:25

## 2017-10-17 RX ADMIN — Medication 10 ML: at 09:41

## 2017-10-17 RX ADMIN — Medication 10 ML: at 09:42

## 2017-10-17 RX ADMIN — ESOMEPRAZOLE SODIUM 40 MG: 40 INJECTION INTRAVENOUS at 09:40

## 2017-10-17 RX ADMIN — ENALAPRILAT 1.25 MG: 1.25 INJECTION INTRAVENOUS at 17:56

## 2017-10-17 RX ADMIN — LABETALOL HYDROCHLORIDE 10 MG: 5 INJECTION, SOLUTION INTRAVENOUS at 03:22

## 2017-10-17 RX ADMIN — FENTANYL CITRATE 50 MCG: 50 INJECTION, SOLUTION INTRAMUSCULAR; INTRAVENOUS at 00:24

## 2017-10-17 RX ADMIN — CEFTRIAXONE SODIUM 1 G: 1 INJECTION, SOLUTION INTRAVENOUS at 18:58

## 2017-10-17 RX ADMIN — ENALAPRILAT 1.25 MG: 1.25 INJECTION INTRAVENOUS at 12:06

## 2017-10-17 RX ADMIN — Medication 10 ML: at 21:18

## 2017-10-17 ASSESSMENT — PAIN SCALES - WONG BAKER

## 2017-10-17 NOTE — PROGRESS NOTES
Physical Therapy  DATE: 10/17/2017  NAME: Mikey Patel  MRN: [id number]   : 1945    Subjective: RN agreeable to range. Pain: Pt unable to rate, Kelley. Patient follows: Some commands  Is patient on ventilator: Yes  Is patient on sedation: Yes      Therapeutic exercises:  PROM all planes x 10 reps  RLE. BUE and LLE attempted, pt with apparent grimace and nodded when asked if in pain so those were differed. Pt had not initially responded to questions of pain. Only RLE performed, pt without grimace or any sign of pain RLE. Per [facility], palliative meeting scheduled this date at noon. Will follow up on results of meeting. Bilateral gastrocnemius stretching    Goals  Short Term Goals  Short term goal 1: Prevent contractures through ROM and stretching  Short term goal 2: Pt to follow most commands for AROM exercises RUE/LE. Short term goal 3: Pt to sit EOB Max A. Short term goal 4: Progress to out of bed mobility as appropriate. Plan: Progress functional mobility as medically appropriate.    Time In: 9507   Time Out: 1009  Time Coded Minutes (treatment minutes): 16  Discharge Recommendation: CTA  Rehab Potential: guarded  Treatments/week: seen 2x/wk    [employer] PT

## 2017-10-17 NOTE — PROGRESS NOTES
[address] [place] [address]     Department of Internal Medicine - Staff Internal Medicine Service     DAILY PROGRESS NOTE  TEAM2    Patient - Prabhakar Jewell  Patient's YOB: 1945  Medical Record Number: [id number]  Acct: [de-identified]   Room and Bed Number -  5303/5222-64   Date of Evaluation -  10/17/2017  Hospital Day - 6    Age: 67 y.o.  female    CHIEF COMPLAINT :   Chief Complaint   Patient presents with    Extremity Weakness      On  10/11/2017  3:54 PM    Admitting Diagnosis: Cerebrovascular accident (CVA) due to thrombosis of right middle cerebral artery ([address]    Subjective:   Pt seen and Chart reviewed. Temp (24hrs), Av.5 °F (36.4 °C), Min:97 °F (36.1 °C), Max:98.2 °F (03.2 °C)    Systolic (89FVF), TARA:504 , Min:124 , PQI:086     Diastolic (40IMP), HQM:68, Min:50, Max:123    BP overnight elevated and labetalol additional dosage given  No change in mentation since yesterday  Pt not able to speak since yesterday  DNR CCA   Hb stable  Platelets 926   UO low 60 ml am    Review of Systems -  Unable to obtain full ROS due to AMS    Objective:   Temperature:  Temp: 97.2 °F (36.2 °C)  Respirations:  Resp: 18  Pulse:   Pulse: 80  BP:    BP: (!) 152/78    General: Patient seems to be clinically stable and more drowsy. Alert but not oriented. HEENT: NC/AT  Heart: S1 S2 present, RRR, no audible rubs or murmurs heard  Lungs: Bilateral air entry noted, normal breath sounds and no additional sounds heard  Abdomen: Soft, non tender and no masses noted. Bowel sounds were normal.   Extremities: no pedal edema; no calf tenderness. No groin hematoma   Skin: No obvious skin rashes   Neurologic: Seems alert but not oriented .  L facial droop and L hemiparesis     Intake/Output Summary (Last 24 hours) at 10/17/17 0931  Last data filed at 10/17/17 0574   Gross per 24 hour   Intake             1113 ml   Output              940 ml   Net              173 ml       Medications:      enalaprilat  1.25 mg Evolving right MCA infarct. Increased edema since comparison exam with slight positive mass effect but no significant midline shift. 2. No intracranial hemorrhage. Ct Head Wo Contrast    Result Date: 10/12/2017    Persistent hyperdense right MCA (M1 segment). Large right MCA distribution acute infarct, newly developed since recent prior study. Xr Chest Portable    Result Date: 10/12/2017    No acute process. Hiatal hernia Right jugular central venous line tip appears to be in the inferior aspect of the right atrium     Xr Chest Portable    Result Date: 10/12/2017    No acute findings. No significant interval change. Cta Neck W Contrast    Addendum Date: 10/12/2017    ADDENDUM: Curved multiplanar reformats performed on an independent workstation have become available. No change to the original report. Result Date: 10/12/2017    Possible moderate stenosis at the origin of the left vertebral artery at the arch of the aorta. Mild plaque in the carotid bulbs, left greater than right, with no significant stenosis. Cta Head W Contrast.     Result Date: 10/12/2017    Complete or near complete occlusion 8 mm segment distal M1 segment right middle cerebral artery territory. Case discussed with Dr. Reyna Sullivan the neuro interventionalist at 5:30 p.m.. Sensitivity is limited without post processed and 3D imaging. An addendum will be performed when these are available. CT Head 10/16  Impression:     1. Progressive edema and mass effect of the right MCA territory infarction,  causing effacement of the right lateral ventricle and increasing shift of  midline structures by approximately 9 mm toward the left side  2. Increased crowding of the brainstem compared to prior. 3. No intracranial bleed.            Assessment and Plan:   Principal Problem:    Cerebrovascular accident (CVA) due to thrombosis of right middle cerebral artery ([address]  Active Problems:    Left-sided weakness    HTN (hypertension)    Hep C w/o

## 2017-10-17 NOTE — PROGRESS NOTES
NEUROLOGY INPATIENT PROGRESS NOTE    10/17/2017         Simeon Mcfarland is a  67 y.o. female admitted on 10/11/2017 with  Acute left-sided weakness [M62.89]  Chart reviewed. Discussed with caregivers. Patient is lethargic and nonverbal. Patient's niece at bedside and she stated that family has not made any decision regarding palliative care/ hospice. Briefly, this is a  67 y.o. female with hx of HTN, scleroderma, ESRD, on hemodialysis was admitted on 10/11/2017 with Lt hemiparesis and Rt gaze preference found to have dense Rt MCA sign on CT head and Rt M1 MCA occlusion  has had attempted and unsuccessful Rt M1 thrombectomy on admit. No current facility-administered medications on file prior to encounter. Current Outpatient Prescriptions on File Prior to Encounter   Medication Sig Dispense Refill    ondansetron (ZOFRAN ODT) 4 MG disintegrating tablet Take 1 tablet by mouth every 8 hours as needed for Nausea or Vomiting 10 tablet 0    Calcium Carbonate-Vit D-Min (CALCIUM 1200 PO) Take 1 capsule by mouth daily      phosphorus (K PHOS NEUTRAL) [phone] MG tablet Take 1 tablet by mouth daily      Cholecalciferol (VITAMIN D) 2000 UNITS TABS tablet Take 1 tablet by mouth daily 30 tablet 3    B Complex-C-Folic Acid (NEPHRO-GEOVANNI) 0.8 MG TABS Take 1 tablet by mouth daily 30 tablet 3     Allergies: Simeon Mcfarland is allergic to other. Past Medical History:   Diagnosis Date    Arthritis     Blood circulation, collateral     legs    Cancer ([facility]     uterine.   Hysterectomy in 1978    Chronic kidney disease 2016    Hemodialysis patient ([facility] [address] [facility]     Sindy Canavan on [facility], sat    History of blood transfusion     Hypertension     Liver disease     Hepatitis C    MRSA (methicillin resistant staph aureus) culture positive 10/11/2017    britton    Neuromuscular disorder ([facility] [facility]     Scleroderma       Past Surgical History:   Procedure Laterality Date    CHOLECYSTECTOMY      DIALYSIS FISTULA EXAMINATION:  GENERAL  Appears comfortable and in no distress   HEENT  NC/ AT   NECK  Supple and no bruits heard   MENTAL STATUS:  Alert, oriented, intact memory, no confusion, normal speech, normal language, no hallucination or delusion   CRANIAL NERVES: II     -       Pupils reactive b/l.  Blinks to threat bilaterally  III,IV,VI -  Rt gaze preference, no afferent defect, no ptosis  V     -     Normal facial sensation  VII    -     Left lower facial weakness  VIII   -     Intact hearing  IX,X -     Symmetrical palate  XI    -     Symmetrical shoulder shrug  XII   -     Midline tongue, no atrophy    MOTOR FUNCTION:  significant for 0/5 in Lt UE and 4/5 in Lt LE and 4/5 in Rt UE and Rt LE with normal bulk, normal tone, normal power;  no involuntary movements, no tremor   SENSORY FUNCTION:  withdraws to pp rt > left    CEREBELLAR FUNCTION:  could not be assessed   REFLEX FUNCTION:  Symmetric, no perverted reflex, bilateral extensor plantars   STATION and GAIT  Not tested         Data:    Lab Results:     Lab Results   Component Value Date    CHOL 122 10/12/2017    LDLCHOLESTEROL 53 10/12/2017    HDL 51 10/12/2017    TRIG 88 10/12/2017    ALT 14 11/19/2016    AST 22 11/19/2016    TSH 1.36 10/12/2017    INR 1.0 10/15/2017    LABA1C 4.9 10/12/2017    LABMICR 204 03/29/2014    LHAJVILG00 2928 (H) 03/28/2014     Hematology:  Recent Labs      10/15/17   0807  10/15/17   1713  10/16/17   0609  10/17/17   0418   WBC  12.1*   --   11.3*  11.4*   HGB  6.8*  8.3*  8.3*  8.5*   HCT  20.5*  24.4*  24.5*  26.8*   PLT  79*   --   87*  104*   INR  1.0   --    --    --      Chemistry:  Recent Labs      10/15/17   0351   10/16/17   0609   10/16/17   2030  10/17/17   0002  10/17/17   0418   NA  152*   < >  158*   < >  156*  156*  158*   K  4.0   --   3.9   --    --    --   4.0   CL  116*   --   122*   --    --    --   123*   CO2  22   --   24   --    --    --   20   GLUCOSE  100*   --   92   --    --    --   111*   BUN  42*   --   46*

## 2017-10-17 NOTE — PROGRESS NOTES
Intensive Care Unit  Endovascular Neurosurgery  Fellow Progress Note      SUBJECTIVE:    Emily Ramirez is a 67 y.o. female presented with L hemiparesis and right gaze preference on 10/11/17 and was found to have R MCA occlusion for which she underwent attempt of thrombectomy which was not successful. 24 Hours Event:  No acute events.     OBJECTIVE:    Current Medications:  Current Facility-Administered Medications: hydroxypropyl methylcellulose (GONIOSOL) 2.5 % ophthalmic solution 1 drop, 1 drop, Left Eye, PRN  amLODIPine (NORVASC) tablet 10 mg, 10 mg, Oral, Daily  labetalol (NORMODYNE;TRANDATE) injection 10 mg, 10 mg, Intravenous, Q15 Min PRN  morphine injection 2 mg, 2 mg, Intravenous, Once  HYDROmorphone (DILAUDID) injection 0.5 mg, 0.5 mg, Intravenous, Q4H PRN  fentaNYL (SUBLIMAZE) injection 25 mcg, 25 mcg, Intravenous, Q2H PRN **OR** fentaNYL (SUBLIMAZE) injection 50 mcg, 50 mcg, Intravenous, Q2H PRN  enalaprilat (VASOTEC) injection 1.25 mg, 1.25 mg, Intravenous, Q6H PRN  heparin (porcine) injection 1,400 Units, 1,400 Units, Intercatheter, PRN  heparin (porcine) injection 1,500 Units, 1,500 Units, Intercatheter, PRN  esomeprazole (NEXIUM) injection 40 mg, 40 mg, Intravenous, Daily  metoprolol (LOPRESSOR) injection 5 mg, 5 mg, Intravenous, Q6H  insulin lispro (HUMALOG) injection vial 0-6 Units, 0-6 Units, Subcutaneous, TID WC  insulin lispro (HUMALOG) injection vial 0-3 Units, 0-3 Units, Subcutaneous, Nightly  glucose (GLUTOSE) 40 % oral gel 15 g, 15 g, Oral, PRN  dextrose 50 % solution 12.5 g, 12.5 g, Intravenous, PRN  glucagon (rDNA) injection 1 mg, 1 mg, Intramuscular, PRN  dextrose 5 % solution, 100 mL/hr, Intravenous, PRN  aspirin chewable tablet 81 mg, 81 mg, Oral, Once  cefTRIAXone (ROCEPHIN) IVPB 1 g, 1 g, Intravenous, Q24H  sodium chloride flush 0.9 % injection 10 mL, 10 mL, Intravenous, 2 times per day  sodium chloride flush 0.9 % injection 10 mL, 10 mL, Intravenous, PRN  sodium chloride flush 0.9 %

## 2017-10-17 NOTE — PROGRESS NOTES
10/12/2017    WBC 11.4 10/17/2017    YEAST NOT REPORTED 10/12/2017    TURBIDITY CLOUDY 10/12/2017     Lab Results   Component Value Date    CREATININE 2.12 10/17/2017    GLUCOSE 111 10/17/2017   CT head 10-16-17  Impression   1. Progressive edema and mass effect of the right MCA territory infarction,   causing effacement of the right lateral ventricle and increasing shift of   midline structures by approximately 9 mm toward the left side   2. Increased crowding of the brainstem compared to prior. 3. No intracranial bleed. The findings were sent to the Radiology Results [address] at 5:34   pm on 10/16/2017to be communicated to a licensed caregiver. CT head 10-14-17  Impression   Large right MCA territory evolving ischemic infarction with right cerebral   hemisphere progressive edema and increased leftward midline shift of 8 mm.       Hyperdense right MCA consistent with thromboembolic disease. CT head 10-12-17  Impression   1. Evolving right MCA infarct.  Increased edema since comparison exam with   slight positive mass effect but no significant midline shift. 2. No intracranial hemorrhage.         KUB 10-12-17  Impression   No evidence of bowel obstruction. CXR 10-12-17  Impression   No acute process.       Right jugular central venous catheter has been repositioned and now   terminates near the junction of the SVC and the right atrium         CXR 10-12-17 1st   Impression   No acute process.       Hiatal hernia       Right jugular central venous line tip appears to be in the inferior aspect of   the right atrium             10/13/2017 11:52 PM - Erasmo, [facility] Incoming Lab Results From [employer]     Component Results     Component Collected Lab   Specimen Description 10/12/2017  1:50  [address]   . CLEAN CATCH URINE    Special Requests 10/12/2017  1:50  [address]   NOT REPORTED    Culture  (Abnormal) 10/12/2017  1:50  [address] CITROBACTER FREUNDII 10 to 50,000 CFU/ML     Culture 10/12/2017  1:50 PM [facility] [address] [facility], [address] [place] [address] [phone]    Status 10/12/2017  1:50  [address]   FINAL 10/13/2017    Organism 10/12/2017  1:50 PM [address] [facility] [address] Performed By     TaeTerri Hood Name Director Address Valid Date Range   [facility] [place] [address] [facility] [address] 08/30/17 1201-Present   Culture & Susceptibility     CITROBACTER FREUNDII     Antibiotic Interpretation CHLOE Status   amikacin  NOT REPORTED Final   aztreonam Sensitive <=1 SUSCEPTIBLE Final   ceFAZolin  NOT REPORTED Final   cefTRIAXone Sensitive <=1 SUSCEPTIBLE Final   cefepime  NOT REPORTED Final   ciprofloxacin Sensitive <=0.25 SUSCEPTIBLE Final   ertapenem  NOT REPORTED Final   gentamicin Sensitive <=1 SUSCEPTIBLE Final   meropenem  NOT REPORTED Final   nitrofurantoin Sensitive <=16 SUSCEPTIBLE Final   piperacillin-tazobactam Sensitive <=4 SUSCEPTIBLE Final   tigecycline  NOT REPORTED Final   tobramycin Sensitive <=1 SUSCEPTIBLE Final   trimethoprim-sulfamethoxazole Sensitive <=20 SUSCEPTIBLE Final          Thank you for allowing us to participate in the care of this patient. Please call with questions. Troy Shaw, MS4       I have examined the patient, reviewed the patient's medical history in detail and updated the computerized patient record. Above exam and data confirmed.     Yandy Moreno MD

## 2017-10-17 NOTE — PROGRESS NOTES
flush  10 mL Intravenous 2 times per day    atorvastatin  40 mg Oral Nightly    0.9 % sodium chloride  250 mL Intravenous Once    sodium chloride  250 mL Intravenous Once     CONTINUOUS INFUSIONS:   dextrose 25 mL/hr (10/15/17 1800)     PRN MEDICATIONS:   hydroxypropyl methylcellulose, labetalol, HYDROmorphone, fentanNYL **OR** fentanNYL, enalaprilat, heparin (porcine), heparin (porcine), glucose, dextrose, glucagon (rDNA), dextrose, sodium chloride flush, sodium chloride flush, sodium chloride flush, acetaminophen, magnesium hydroxide, ondansetron    VITALS:  Temperature Range: Temp: 97.2 °F (36.2 °C) Temp  Av.5 °F (36.4 °C)  Min: 97 °F (36.1 °C)  Max: 98.2 °F (36.8 °C)  BP Range: Systolic (13EQF), ANL:691 , Min:124 , OJF:449     Diastolic (27KGO), PD, Min:50, Max:123    Pulse Range: Pulse  Av.9  Min: 69  Max: 106  Respiration Range: Resp  Av.4  Min: 14  Max: 28  Current Pulse Ox: SpO2: 100 %  24HR Pulse Ox Range: SpO2  Av.3 %  Min: 92 %  Max: 100 %  Patient Vitals for the past 12 hrs:   BP Temp Temp src Pulse Resp SpO2   10/17/17 0800 (!) 152/78 97.2 °F (36.2 °C) Axillary 80 18 -   10/17/17 0700 (!) 148/123 - - - - -   10/17/17 0604 (!) 157/87 - - 86 18 100 %   10/17/17 0532 (!) 129/104 - - 80 16 100 %   10/17/17 0503 (!) 153/99 - - 88 21 100 %   10/17/17 0432 129/68 - - 95 15 100 %   10/17/17 0417 (!) 124/50 98.1 °F (36.7 °C) Axillary 73 17 100 %   10/17/17 0332 134/64 - - 90 28 92 %   10/17/17 0302 (!) 165/70 - - 97 15 100 %   10/17/17 0232 (!) 151/68 - - 98 17 100 %   10/17/17 0203 (!) 151/56 - - 96 22 99 %   10/17/17 0132 (!) 134/58 - - 78 20 100 %   10/17/17 0126 (!) 141/55 - - 75 20 99 %   10/17/17 0102 (!) 141/55 - - 74 18 98 %   10/17/17 0032 (!) 149/54 - - 69 14 98 %   10/17/17 0002 (!) 179/63 - - 78 21 100 %   10/16/17 2333 (!) 175/70 - - 82 20 98 %   10/16/17 2302 (!) 194/61 - - 78 15 100 %   10/16/17 2232 (!) 192/67 - - 75 15 100 %   10/16/17 2202 (!) 197/74 - - 81 18 100 %

## 2017-10-18 LAB
ABSOLUTE EOS #: 0.2 K/UL (ref 0–0.4)
ABSOLUTE EOS #: 0.3 K/UL (ref 0–0.4)
ABSOLUTE IMMATURE GRANULOCYTE: ABNORMAL K/UL (ref 0–0.3)
ABSOLUTE IMMATURE GRANULOCYTE: ABNORMAL K/UL (ref 0–0.3)
ABSOLUTE LYMPH #: 0.9 K/UL (ref 1–4.8)
ABSOLUTE LYMPH #: 1 K/UL (ref 1–4.8)
ABSOLUTE MONO #: 0.7 K/UL (ref 0.1–1.2)
ABSOLUTE MONO #: 0.9 K/UL (ref 0.1–1.2)
ANION GAP SERPL CALCULATED.3IONS-SCNC: 10 MMOL/L (ref 9–17)
BASOPHILS # BLD: 0 %
BASOPHILS # BLD: 0 %
BASOPHILS ABSOLUTE: 0 K/UL (ref 0–0.2)
BASOPHILS ABSOLUTE: 0 K/UL (ref 0–0.2)
BUN BLDV-MCNC: 53 MG/DL (ref 8–23)
BUN/CREAT BLD: ABNORMAL (ref 9–20)
CALCIUM SERPL-MCNC: 8.2 MG/DL (ref 8.6–10.4)
CHLORIDE BLD-SCNC: 123 MMOL/L (ref 98–107)
CO2: 25 MMOL/L (ref 20–31)
CREAT SERPL-MCNC: 2.07 MG/DL (ref 0.5–0.9)
CULTURE: NORMAL
DIFFERENTIAL TYPE: ABNORMAL
DIFFERENTIAL TYPE: ABNORMAL
EOSINOPHILS RELATIVE PERCENT: 2 %
EOSINOPHILS RELATIVE PERCENT: 3 %
GFR AFRICAN AMERICAN: 28 ML/MIN
GFR NON-AFRICAN AMERICAN: 23 ML/MIN
GFR SERPL CREATININE-BSD FRML MDRD: ABNORMAL ML/MIN/{1.73_M2}
GFR SERPL CREATININE-BSD FRML MDRD: ABNORMAL ML/MIN/{1.73_M2}
GLUCOSE BLD-MCNC: 98 MG/DL (ref 70–99)
HCT VFR BLD CALC: 26.2 % (ref 36–46)
HCT VFR BLD CALC: 26.8 % (ref 36–46)
HEMOGLOBIN: 8.7 G/DL (ref 12–16)
HEMOGLOBIN: 8.7 G/DL (ref 12–16)
IMMATURE GRANULOCYTES: ABNORMAL %
IMMATURE GRANULOCYTES: ABNORMAL %
LYMPHOCYTES # BLD: 9 %
LYMPHOCYTES # BLD: 9 %
Lab: NORMAL
Lab: NORMAL
MCH RBC QN AUTO: 30.4 PG (ref 26–34)
MCH RBC QN AUTO: 31.5 PG (ref 26–34)
MCHC RBC AUTO-ENTMCNC: 32.6 G/DL (ref 31–37)
MCHC RBC AUTO-ENTMCNC: 33.2 G/DL (ref 31–37)
MCV RBC AUTO: 93.1 FL (ref 80–100)
MCV RBC AUTO: 94.8 FL (ref 80–100)
MONOCYTES # BLD: 7 %
MONOCYTES # BLD: 8 %
PDW BLD-RTO: 15.1 % (ref 12.5–15.4)
PDW BLD-RTO: 15.8 % (ref 12.5–15.4)
PLATELET # BLD: 109 K/UL (ref 140–450)
PLATELET # BLD: 32 K/UL (ref 140–450)
PLATELET ESTIMATE: ABNORMAL
PLATELET ESTIMATE: ABNORMAL
PMV BLD AUTO: 8 FL (ref 6–12)
PMV BLD AUTO: 8.4 FL (ref 6–12)
POTASSIUM SERPL-SCNC: 3.7 MMOL/L (ref 3.7–5.3)
RBC # BLD: 2.77 M/UL (ref 4–5.2)
RBC # BLD: 2.88 M/UL (ref 4–5.2)
RBC # BLD: ABNORMAL 10*6/UL
RBC # BLD: ABNORMAL 10*6/UL
SEG NEUTROPHILS: 80 %
SEG NEUTROPHILS: 82 %
SEGMENTED NEUTROPHILS ABSOLUTE COUNT: 7.5 K/UL (ref 1.8–7.7)
SEGMENTED NEUTROPHILS ABSOLUTE COUNT: 9.1 K/UL (ref 1.8–7.7)
SODIUM BLD-SCNC: 154 MMOL/L (ref 135–144)
SODIUM BLD-SCNC: 157 MMOL/L (ref 135–144)
SODIUM BLD-SCNC: 158 MMOL/L (ref 135–144)
SODIUM BLD-SCNC: 162 MMOL/L (ref 135–144)
SPECIMEN DESCRIPTION: NORMAL
SPECIMEN DESCRIPTION: NORMAL
STATUS: NORMAL
STATUS: NORMAL
WBC # BLD: 11.3 K/UL (ref 3.5–11)
WBC # BLD: 9.3 K/UL (ref 3.5–11)
WBC # BLD: ABNORMAL 10*3/UL
WBC # BLD: ABNORMAL 10*3/UL

## 2017-10-18 PROCEDURE — 86022 PLATELET ANTIBODIES: CPT

## 2017-10-18 PROCEDURE — 99232 SBSQ HOSP IP/OBS MODERATE 35: CPT | Performed by: PSYCHIATRY & NEUROLOGY

## 2017-10-18 PROCEDURE — 85025 COMPLETE CBC W/AUTO DIFF WBC: CPT

## 2017-10-18 PROCEDURE — 94762 N-INVAS EAR/PLS OXIMTRY CONT: CPT

## 2017-10-18 PROCEDURE — 2580000003 HC RX 258: Performed by: INTERNAL MEDICINE

## 2017-10-18 PROCEDURE — 36415 COLL VENOUS BLD VENIPUNCTURE: CPT

## 2017-10-18 PROCEDURE — 84295 ASSAY OF SERUM SODIUM: CPT

## 2017-10-18 PROCEDURE — 99231 SBSQ HOSP IP/OBS SF/LOW 25: CPT | Performed by: INTERNAL MEDICINE

## 2017-10-18 PROCEDURE — 1200000000 HC SEMI PRIVATE

## 2017-10-18 PROCEDURE — 6360000002 HC RX W HCPCS: Performed by: STUDENT IN AN ORGANIZED HEALTH CARE EDUCATION/TRAINING PROGRAM

## 2017-10-18 PROCEDURE — 2500000003 HC RX 250 WO HCPCS: Performed by: STUDENT IN AN ORGANIZED HEALTH CARE EDUCATION/TRAINING PROGRAM

## 2017-10-18 PROCEDURE — 99233 SBSQ HOSP IP/OBS HIGH 50: CPT | Performed by: PSYCHIATRY & NEUROLOGY

## 2017-10-18 PROCEDURE — 99232 SBSQ HOSP IP/OBS MODERATE 35: CPT | Performed by: INTERNAL MEDICINE

## 2017-10-18 PROCEDURE — 2580000003 HC RX 258: Performed by: EMERGENCY MEDICINE

## 2017-10-18 PROCEDURE — 80048 BASIC METABOLIC PNL TOTAL CA: CPT

## 2017-10-18 RX ADMIN — Medication 10 ML: at 09:00

## 2017-10-18 RX ADMIN — ESOMEPRAZOLE SODIUM 40 MG: 40 INJECTION INTRAVENOUS at 09:00

## 2017-10-18 RX ADMIN — FENTANYL CITRATE 25 MCG: 50 INJECTION, SOLUTION INTRAMUSCULAR; INTRAVENOUS at 23:59

## 2017-10-18 RX ADMIN — FENTANYL CITRATE 25 MCG: 50 INJECTION, SOLUTION INTRAMUSCULAR; INTRAVENOUS at 15:55

## 2017-10-18 RX ADMIN — FENTANYL CITRATE 25 MCG: 50 INJECTION, SOLUTION INTRAMUSCULAR; INTRAVENOUS at 21:29

## 2017-10-18 ASSESSMENT — PAIN SCALES - WONG BAKER

## 2017-10-18 NOTE — PROGRESS NOTES
FISTULA CREATION      HYSTERECTOMY      TONSILLECTOMY      TUNNELED VENOUS CATHETER PLACEMENT Right 09/14/2016    Rt. IJ judy split insertion/ exchange over [place]    TUNNELED VENOUS CATHETER PLACEMENT Right 10/10/2016    REMOVAL NONFUNCTIONING HD CATH, EXCANGED WITH NEW     Social History: Pili Ziegler  reports that she has never smoked. She has never used smokeless tobacco. She reports that she does not drink alcohol or use drugs. History reviewed. No pertinent family history.     Current Medications:     enalaprilat  1.25 mg Intravenous 4 times per day    amLODIPine  10 mg Oral Daily    morphine  2 mg Intravenous Once    esomeprazole  40 mg Intravenous Daily    metoprolol  5 mg Intravenous Q6H    insulin lispro  0-6 Units Subcutaneous TID WC    insulin lispro  0-3 Units Subcutaneous Nightly    aspirin  81 mg Oral Once    cefTRIAXone (ROCEPHIN) IV  1 g Intravenous Q24H    sodium chloride flush  10 mL Intravenous 2 times per day    sodium chloride flush  10 mL Intravenous 2 times per day    sodium chloride flush  10 mL Intravenous 2 times per day    atorvastatin  40 mg Oral Nightly    0.9 % sodium chloride  250 mL Intravenous Once    sodium chloride  250 mL Intravenous Once     PRN Meds include: hydroxypropyl methylcellulose, labetalol, HYDROmorphone, fentanNYL **OR** fentanNYL, heparin (porcine), heparin (porcine), glucose, dextrose, glucagon (rDNA), dextrose, sodium chloride flush, sodium chloride flush, sodium chloride flush, acetaminophen, magnesium hydroxide, ondansetron      Objective:   /64   Pulse 72   Temp 97.3 °F (36.3 °C)   Resp 19   Ht 5' 5\" (1.651 m)   Wt 269 lb 2.9 oz (122.1 kg)   LMP  (LMP Unknown) Comment: post menopausal  SpO2 100%   BMI 44.79 kg/m²     Blood pressure range: Systolic (66IOH), KLW:230 , Min:117 , YDX:715   ; Diastolic (53SPC), PFC:81, Min:44, Max:117      Continuous infusions:    dextrose 25 mL/hr (10/15/17 1800)       ON EXAMINATION:  GENERAL --   8.1*   --    --    --   8.2*    < > = values in this interval not displayed. No results for input(s): PROT, LABALBU, LABA1C, C0VEOTA, O0REABP, FT4, TSH, AST, ALT, LDH, AMMONIA, CHOL, HDL, LDLCHOLESTEROL, CHOLHDLRATIO, TRIG, VLDL, CKTOTAL, CKMB, CKMBINDEX, RF, ANDRES in the last 72 hours. No results found for: PHENYTOIN, PHENYTOIN, VALPROATE, CBMZ        Diagnostic data reviewed:  CT head (10/12/17): Persistent hyperdense Rt MCA sign  (M1 segment). Large Rt MCA distribution acute infarct. CTA head (10/11/17): Complete or near-complete occlusion 8 mm segment distal M1 segment right MCA territory. CTA Neck (10/11/17): moderate stenosis at origin of Lt vertebral artery at arch of aorta. Mild plaque in carotid bulbs, left greater than right, with no significant stenosis. Follow up CT Head (10/14/17); Large Rt MCA infarct with progressive edema and increased left freeman midline shift of 8mm. Hyperdense Rt MCA consistent with thromboembolic disease. Follow-up CT head (10/16/17): Progressive edema and mass effect of right MCA territory infarction causing effacement of right lateral ventricle and increasing shift of midline structures by approximately 9 mm toward the left side. No intracranial bleed. Impression and Plan: Ms. Kevin Prince is a 67 y.o. female with   Decline in neurologic status with worsened cerebral edema with malignant Rt MCA ischemic infarction  Rt M1 MCA occlusion; post TPA; s/p attempted and unsuccessful Rt M1 thrombectomy (10/11/17)    Continue aspirin 81 mg daily, Lipitor 40 mg nightly; target systolic <  938 as per endovascular neuro-. Continue PT/OT/speech therapy. Urosepsis: on Ceftriaxone. Comorbid ESRD (on hemodialysis); hypertension, scleroderma    Careplan and findings on CT head were reviewed with patient's sister at bedside. Presently DNR-CCA. Family is discussing among themselves and has not made any decisions yet regarding moving toward hospice.    Will

## 2017-10-18 NOTE — PROGRESS NOTES
fentanNYL **OR** fentanNYL, glucose, dextrose, glucagon (rDNA), dextrose, sodium chloride flush, sodium chloride flush, sodium chloride flush, acetaminophen, magnesium hydroxide, ondansetron  Home Meds:                Prescriptions Prior to Admission: ondansetron (ZOFRAN ODT) 4 MG disintegrating tablet, Take 1 tablet by mouth every 8 hours as needed for Nausea or Vomiting  Calcium Carbonate-Vit D-Min (CALCIUM 1200 PO), Take 1 capsule by mouth daily  phosphorus (K PHOS NEUTRAL) 155-852-130 MG tablet, Take 1 tablet by mouth daily  Cholecalciferol (VITAMIN D) 2000 UNITS TABS tablet, Take 1 tablet by mouth daily  B Complex-C-Folic Acid (NEPHRO-GEOVANNI) 0.8 MG TABS, Take 1 tablet by mouth daily    INVESTIGATIONS     Last 3 CMP:    Recent Labs      10/16/17   0609   10/17/17   0418   10/18/17   0007  10/18/17   0349  10/18/17   0617   NA  158*   < >  158*   < >  157*  154*  158*   K  3.9   --   4.0   --    --    --   3.7   CL  122*   --   123*   --    --    --   123*   CO2  24   --   20   --    --    --   25   BUN  46*   --   52*   --    --    --   53*   CREATININE  2.20*   --   2.12*   --    --    --   2.07*   CALCIUM  7.9*   --   8.1*   --    --    --   8.2*    < > = values in this interval not displayed. Last 3 CBC:  Recent Labs      10/16/17   0609  10/17/17   0418  10/18/17   0617   WBC  11.3*  11.4*  11.3*   RBC  2.68*  2.79*  2.88*   HGB  8.3*  8.5*  8.7*   HCT  24.5*  26.8*  26.8*   MCV  91.7  95.8  93.1   MCH  31.2  30.6  30.4   MCHC  34.0  32.0  32.6   RDW  15.5*  16.2*  15.1   PLT  87*  104*  32*   MPV  8.8  8.1  8.4       ASSESSMENT     1. ESRD on Hemodialysis. His regular HD days are T, TH, SAT at [facility] facility using L upper extremity fistula, poor thrill/bruit temp cath placed yesterday, K 4.0 today   2. Secondary hyperparathyroidism  3. Hypertension            4. Developing malignant R MCA acute infarct with worsening edema, midline shift  4. Acute left sided weakness  5.  Hep C

## 2017-10-18 NOTE — SIGNIFICANT EVENT
Family meeting held today.     Participants:   Jorge-Brother  Jannet-Sister  Michaela-Sister-in-law and wife of Rajeev Estrada' ill brother Byron Taveras and Omar's daughter  Location: [facility] conference room  Duration: Roughly 20 mins    Summary:  Elizabeth Dylan and other family members admitted to knowing that Rajeev Estrada is very ill  -They know that the complications of her acute stroke are not irreversible  -They began to describe her as a very strong [employer] with a strong belief  -Geoffrey Chapin shared that Rajeev Estrada chose to go on dialysis so that she'd be able to spread the [employer] to others  -Now that she's unable to interact with anyone, with there being no hope for any meaningful neurologic recovery, the family voiced their confidence in knowing that Rajeev Estrada would not want to continue living this way  -The family then became tearful, but agreeable to going ahead with focusing on comfort from now, all agreeing to discontinuing dialysis  -We ended by deciding to make [facility], and transferring her a palliative bed

## 2017-10-18 NOTE — PROGRESS NOTES
[facility] - Progress Note  Today's Date and Time: 10/18/2017, 8:56 AM    Impression :   1. Sepsis in the setting of 3/4 SIRS criteria. -secondary to UTI   · Previous history of UTI + citrobacter freundii on 10-1-17  2. Occlusion of the Rt MCA s/p tPA and unsuccessful thrombectomy. · With brain edema/metabolic encephalopathy - worsening edema. 3. ESRD on HD but makes urine. TTS normal days. 4. Anemia -Hgb remaining stable for now. · Concern for GI bleed vs retroperitoneal bleeding. 5. Morbid obesity  6. HLD  7. HTN  8. Hypernatremia secondary to hypertonic saline solution. 9. DNR-CCA    Recommendations     · Discontinue Rocephin. (Day 7. For citrobacter UTI with sepsis). Stop date 10-18-17. · Leukocytosis and sepsis resolving  · Repeat UA tomorrow. Hernandez removed yesterday. Using female external wick. · Family meeting tomorrow with Palliative Care. Chief complaint/reason for consultation:   Sepsis with no source. History of Present Illness:     INITIAL HISTORY 10-13-17:    Cydney Finley is a 67y.o.-year-old  female who was initially admitted on 10/11/2017. Patient seen at the request of Dr. Martha Caba.    Patient has a past medical history of ESRD on HD, morbid obesity, Hepatitis C, and HTN. Patient was brought to the emergency room after falling out of her wheelchair on 10-11-17. Stroke team evaluated patient and noticed left facial droop, left hemiparesis, and right sided gaze preference. NIH around 10. Underwent imaging and noted to have MCA occlusion and tPA was administered. Patient also underwent attempted M1 MCA thrombectomy which was unsuccessful. Infectious disease is consulted for sepsis with no identifiable source. Patient last 24-48 hours has remained tachycardic 100-130's & tachypneic. Afebrile. WBC count upon admission was 6.9 which has trended up to 34.5 yesterday. Blood cultures drawn yesterday and no growth 17 hours.  UA 10-12-17 shows

## 2017-10-18 NOTE — PROGRESS NOTES
Ms Scales Crew code status has been changed to [facility]. We will sign off at this time. Please, call with any questions or concerns.  Thank you

## 2017-10-19 ENCOUNTER — HOSPITAL ENCOUNTER (INPATIENT)
Age: 72
LOS: 1 days | Discharge: HOSPICE | DRG: 064 | End: 2017-10-20
Attending: INTERNAL MEDICINE | Admitting: INTERNAL MEDICINE
Payer: COMMERCIAL

## 2017-10-19 VITALS
HEIGHT: 65 IN | TEMPERATURE: 98.9 F | BODY MASS INDEX: 44.85 KG/M2 | RESPIRATION RATE: 14 BRPM | SYSTOLIC BLOOD PRESSURE: 135 MMHG | WEIGHT: 269.18 LBS | DIASTOLIC BLOOD PRESSURE: 61 MMHG | HEART RATE: 88 BPM | OXYGEN SATURATION: 96 %

## 2017-10-19 LAB — HEPARIN INDUCED PLATELET ANTIBODY: 0.22 O.D. (ref 0–0.4)

## 2017-10-19 PROCEDURE — 6360000002 HC RX W HCPCS: Performed by: INTERNAL MEDICINE

## 2017-10-19 PROCEDURE — 1200000000 HC SEMI PRIVATE

## 2017-10-19 PROCEDURE — 99232 SBSQ HOSP IP/OBS MODERATE 35: CPT | Performed by: INTERNAL MEDICINE

## 2017-10-19 PROCEDURE — 6360000002 HC RX W HCPCS: Performed by: STUDENT IN AN ORGANIZED HEALTH CARE EDUCATION/TRAINING PROGRAM

## 2017-10-19 PROCEDURE — 99231 SBSQ HOSP IP/OBS SF/LOW 25: CPT | Performed by: INTERNAL MEDICINE

## 2017-10-19 RX ORDER — LORAZEPAM 2 MG/ML
1 INJECTION INTRAMUSCULAR
Status: DISCONTINUED | OUTPATIENT
Start: 2017-10-19 | End: 2017-10-19 | Stop reason: HOSPADM

## 2017-10-19 RX ORDER — GLYCOPYRROLATE 0.2 MG/ML
0.2 INJECTION INTRAMUSCULAR; INTRAVENOUS EVERY 6 HOURS PRN
Status: CANCELLED | OUTPATIENT
Start: 2017-10-19

## 2017-10-19 RX ORDER — LORAZEPAM 2 MG/ML
1 INJECTION INTRAMUSCULAR
Status: CANCELLED | OUTPATIENT
Start: 2017-10-19

## 2017-10-19 RX ORDER — LORAZEPAM 2 MG/ML
1 INJECTION INTRAMUSCULAR
Status: DISCONTINUED | OUTPATIENT
Start: 2017-10-19 | End: 2017-10-20 | Stop reason: HOSPADM

## 2017-10-19 RX ORDER — GLYCOPYRROLATE 0.2 MG/ML
0.1 INJECTION INTRAMUSCULAR; INTRAVENOUS EVERY 6 HOURS PRN
Status: DISCONTINUED | OUTPATIENT
Start: 2017-10-19 | End: 2017-10-19 | Stop reason: HOSPADM

## 2017-10-19 RX ORDER — SODIUM CHLORIDE 9 MG/ML
INJECTION, SOLUTION INTRAVENOUS CONTINUOUS
Status: DISCONTINUED | OUTPATIENT
Start: 2017-10-19 | End: 2017-10-19 | Stop reason: HOSPADM

## 2017-10-19 RX ORDER — GLYCOPYRROLATE 1 MG/5ML
0.02 SOLUTION ORAL EVERY 6 HOURS PRN
Status: DISCONTINUED | OUTPATIENT
Start: 2017-10-19 | End: 2017-10-19

## 2017-10-19 RX ORDER — GLYCOPYRROLATE 0.2 MG/ML
0.2 INJECTION INTRAMUSCULAR; INTRAVENOUS EVERY 6 HOURS PRN
Status: DISCONTINUED | OUTPATIENT
Start: 2017-10-19 | End: 2017-10-20 | Stop reason: HOSPADM

## 2017-10-19 RX ORDER — GLYCOPYRROLATE 0.2 MG/ML
0.2 INJECTION INTRAMUSCULAR; INTRAVENOUS EVERY 6 HOURS PRN
Status: DISCONTINUED | OUTPATIENT
Start: 2017-10-19 | End: 2017-10-19 | Stop reason: HOSPADM

## 2017-10-19 RX ADMIN — FENTANYL CITRATE 50 MCG: 50 INJECTION, SOLUTION INTRAMUSCULAR; INTRAVENOUS at 13:30

## 2017-10-19 RX ADMIN — HYDROMORPHONE HYDROCHLORIDE 0.5 MG: 1 INJECTION, SOLUTION INTRAMUSCULAR; INTRAVENOUS; SUBCUTANEOUS at 16:47

## 2017-10-19 RX ADMIN — FENTANYL CITRATE 25 MCG: 50 INJECTION, SOLUTION INTRAMUSCULAR; INTRAVENOUS at 06:38

## 2017-10-19 RX ADMIN — FENTANYL CITRATE 25 MCG: 50 INJECTION, SOLUTION INTRAMUSCULAR; INTRAVENOUS at 12:12

## 2017-10-19 RX ADMIN — HYDROMORPHONE HYDROCHLORIDE 0.5 MG: 1 INJECTION, SOLUTION INTRAMUSCULAR; INTRAVENOUS; SUBCUTANEOUS at 21:00

## 2017-10-19 ASSESSMENT — PAIN - FUNCTIONAL ASSESSMENT: PAIN_FUNCTIONAL_ASSESSMENT: FACES

## 2017-10-19 NOTE — CONSULTS
Palliative Care Inpatient Consult    NAME:  Kenny Owens RECORD NUMBER:  [id number]  AGE: 67 y.o. GENDER: female  : 1945  TODAY'S DATE:  10/19/2017    Reasons for Consultation:    Symptom and/or pain management  Provision of information regarding PC and/or hospice philosophies  Complex, time-intensive communication and interdisciplinary psychosocial support  Clarification of goals of care and/or assistance with difficult decision-making  Guidance in regards to resources and transition(s)    Members of PC team contributing to this consultation are :  Brandon Fuentes Fellow  Dr. Daniel Will Attending  History of Present Illness   Ms. Natividad Hernandez is a 67year old lady with a history of Hypertension, ESRD, morbid obesity and chronic hepatitis C who presented with left-sided weakness on 10/11/17. CT-brain revealed an acute right MCA stroke. Carmen Funes did receive tPA upon admission. Right MCA thrombectomy was then attempted but was unsuccessful. Admission course has since been complicated by worsening cerebral edema, and sepsis due to UTI. Due to worsening neurologic status, Dr. Dylan Cowart spoke with Carmen Funes' family on 10/14 where he admitted to her poor neurologic prognosis, and her being a poor candidate for surgery. Carmen Funes was then made DNR-CCA. The palliative care team was consulted for evaluation for goals of goals of care.     Referred to Palliative Care by   [x] Physician   [] Nursing  [] Family Request   [] Other:      Interval events  -Made DNR-CC yesterday  -Transferred to palliative bed  -Since then has looked comfortable; has audible upper airway secretions though  -Currently on Fentanyl 25 mcg or 50 mcg IV prn for pain  -Carmen Funes' sister China Hackett is interested in hospice, specifically at any on of the facilities [facility]    Diagnosis Date Noted    Comfort measures only status [Z51.5]     Nonverbal [R47.01]     Acute ischemic stroke ([facility] [I63.9]     Anemia in stage 5 chronic kidney disease, not on chronic dialysis ([facility] [N18.5, D63.1]     Morbid obesity, unspecified obesity type ([facility] [E66.01]     Lethargy [R53.83]     Acute blood loss anemia [D62] 10/13/2017    Acute encephalopathy [G93.40] 10/13/2017    Thrombocytopenia ([facility] [D69.6] 10/13/2017    Acute cystitis without hematuria [N30.00]     Cerebral edema (HCC) [G93.6]     Weakness of extremity [R29.898]     Left-sided neglect [R41.4]     Cerebrovascular accident (CVA) due to embolism of right middle cerebral artery ([facility] [I63.411]     Morbid obesity ([facility] [E66.01] 10/11/2017    Left-sided weakness [R53.1] 10/11/2017    Cerebrovascular accident (CVA) due to thrombosis of right middle cerebral artery ([facility] [I63.311] 10/11/2017    ESRD on hemodialysis ([facility] [N18.6, Z99.2] 09/27/2016    Hep C w/o coma, chronic (HCC) [B18.2]     HTN (hypertension) [I10] 04/01/2014       PAST MEDICAL HISTORY      Diagnosis Date    Arthritis     Blood circulation, collateral     legs    Cancer ([facility]     uterine. Hysterectomy in 1978    Chronic kidney disease 2016    Hemodialysis patient ([facility]     [facility]    History of blood transfusion     Hypertension     Liver disease     Hepatitis C    MRSA (methicillin resistant staph aureus) culture positive 10/11/2017    nares    Neuromuscular disorder ([facility]     Scleroderma       PAST SURGICAL HISTORY  Past Surgical History:   Procedure Laterality Date    CHOLECYSTECTOMY      DIALYSIS FISTULA CREATION      HYSTERECTOMY      TONSILLECTOMY      TUNNELED VENOUS CATHETER PLACEMENT Right 09/14/2016    Rt.  IJ judy split insertion/ exchange over [place]    TUNNELED VENOUS CATHETER PLACEMENT Right 10/10/2016    REMOVAL NONFUNCTIONING HD CATH, EXCANGED WITH NEW       SOCIAL HISTORY  Social History   Substance Use Topics    Smoking status: Never Smoker    Smokeless tobacco: Never Used    Alcohol use No entry  Heart - normal rate, regular rhythm, normal S1, S2, no murmurs, rubs, clicks or gallops  Abdomen - soft, nontender, nondistended, no masses or organomegaly  Neurological - Left hemiparesis  Extremities - Nil edema    Palliative Performance Scale:  ___60%  Ambulation reduced; Significant disease; Can't do hobbies/housework; intake normal or reduced; occasional assist; LOC full/confusion  ___50%  Mainly sit/lie; Extensive disease; Can't do any work; Considerable assist; intake normal or reduced; LOC full/confusion  ___40%  Mainly in bed; Extensive disease; Mainly assist; intake normal or reduced; LOC full/confusion   _X__30%  Bed Bound; Extensive disease; Total care; intake reduced; LOCfull/confusion  ___20%  Bed Bound; Extensive disease; Total care; intake minimal; Drowsy/coma  ___10%  Bed Bound; Extensive disease; Total care; Mouth care only; Drowsy/coma  ___0       Death      Plan      Palliative Interaction:    Ayad Alcala seems comfortable at this point, but occasionally has loud upper airway secretions. We recommend light oropharyngeal suctioning prn, repositioning as needed, in addition to glcopyrrolate prn. Principle Problem/Diagnosis:  Cerebrovascular accident (CVA) due to thrombosis of right middle cerebral artery ([address]    #Symptoms:  -Audible upper airway secretions  -Oropharnygeal suctioning, and repositioning prn  -IV glycopyrrolate prn  -On Fentanyl IV prn    #Goals of care evaluation   -Ayad Alcala' care is now focused on comfort  -Will talk to family about possibly pursuing hospice services at [facility]    # Code Status  SPECIAL[facility]    #Other recommendations  Please call with any palliative questions or concerns. Palliative Care Team is available via perfect serve or via phone. Palliative Care will continue to follow Ms. Martínez's care as needed. Thank you for allowing Palliative Care to participate in the care of Ms. Reg Stewart .     Electronically signed by   Otho Simmonds, MD  Palliative Care Team  on 10/19/2017 at 12:46 PM    Palliative care office: [phone]    Attending Physician Statement  The patient was evaluated during rounds, I have discussed the care of the patient, including pertinent history and exam findings with the palliative care team. The patient was independently seen and examined. Family also met .  The note above was reviewed and modified and reflects my evaluation    Brisa Jolley MD  Attending physician   [address] [place] [address]: [phone]

## 2017-10-19 NOTE — PROGRESS NOTES
[address] [place] [address]     Department of Internal Medicine - Staff Internal Medicine Service     DAILY PROGRESS NOTE  TEAM2    Patient - Kevin Prince  Patient's YOB: 1945  Medical Record Number: [id number]  Acct: [de-identified]   Room and Bed Number -     Date of Evaluation -  10/19/2017  Hospital Day - 8    Age: 67 y.o.  female    CHIEF COMPLAINT :   Chief Complaint   Patient presents with    Extremity Weakness      On  10/11/2017  3:54 PM    Admitting Diagnosis: Cerebrovascular accident (CVA) due to thrombosis of right middle cerebral artery ([address]    Subjective:   Pt seen and Chart reviewed. Temp (24hrs), Av.2 °F (36.8 °C), Min:97.3 °F (36.3 °C), Max:98.9 °F (94.6 °C)    Systolic (36QCA), QMN:521 , Min:135 , AFE:184     Diastolic (20CIO), XEC:84, Min:61, Max:61    BP controlled  DNR CC now  Family at bedside    Review of Systems -  Unable to obtain full ROS due to AMS    Objective:   Temperature:  Temp: 98.9 °F (37.2 °C)  Respirations:  Resp: 14  Pulse:   Pulse: 88  BP:    BP: 135/61    General: more drowsy. Alert but not oriented. HEENT: NC/AT  Heart: S1 S2 present, RRR, no audible rubs or murmurs heard  Lungs: Bilateral air entry noted, normal breath sounds and no additional sounds heard  Abdomen: Soft, non tender and no masses noted. Bowel sounds were normal.   Extremities: no pedal edema; no calf tenderness. No groin hematoma   Skin: No obvious skin rashes   Neurologic: Seems alert but not oriented .  L facial droop and L hemiparesis   No intake or output data in the 24 hours ending 10/19/17 1402    Medications:          Diagnostic Labs   CBC:   Recent Labs      10/17/17   0418  10/18/17   0617  10/18/17   1446   WBC  11.4*  11.3*  9.3   RBC  2.79*  2.88*  2.77*   HGB  8.5*  8.7*  8.7*   HCT  26.8*  26.8*  26.2*   MCV  95.8  93.1  94.8   RDW  16.2*  15.1  15.8*   PLT  104*  32*  109*     BMP:   Recent Labs      10/17/17   0418   10/18/17   0349  10/18/17   0617 cerebral artery territory. Case discussed with Dr. Alvina Torres the neuro interventionalist at 5:30 p.m.. Sensitivity is limited without post processed and 3D imaging. An addendum will be performed when these are available. CT Head 10/16  Impression:     1. Progressive edema and mass effect of the right MCA territory infarction,  causing effacement of the right lateral ventricle and increasing shift of  midline structures by approximately 9 mm toward the left side  2. Increased crowding of the brainstem compared to prior. 3. No intracranial bleed. Assessment and Plan:   Principal Problem:    Cerebrovascular accident (CVA) due to thrombosis of right middle cerebral artery (HCC)  Active Problems:    Left-sided weakness    HTN (hypertension)    Hep C w/o coma, chronic (HCC)    ESRD on hemodialysis ([address]    Morbid obesity ([address]    Weakness of extremity    Left-sided neglect    Cerebrovascular accident (CVA) due to embolism of right middle cerebral artery ([address]    Acute cystitis without hematuria    Cerebral edema (HCC)    Acute blood loss anemia    Acute encephalopathy    Thrombocytopenia (HCC)    Morbid obesity, unspecified obesity type ([address]    Lethargy    Nonverbal    Acute ischemic stroke (HCC)    Anemia in stage 5 chronic kidney disease, not on chronic dialysis ([address]    Comfort measures only status  - Sepsis ruled out with 2 sets of negative blood cultures      - [facility]  - Ativan/fentanyl/glycopyrolate  - Hospice transfer        Silas Sims MD      Department of Internal Medicine  [address]         10/19/2017, 2:02 PM   Attending Physician Statement  I have discussed the care of Jihan Nuñez, including pertinent history and exam findings,  with the resident. I have reviewed the key elements of all parts of the encounter with the resident. I agree with the assessment, plan and orders as documented by the resident. Pt seen discussed and examined.    (GC

## 2017-10-19 NOTE — PLAN OF CARE
Problem: Breathing Pattern - Ineffective:  Goal: Able to breathe comfortably  Able to breathe comfortably   Outcome: Ongoing

## 2017-10-19 NOTE — PROGRESS NOTES
Erasmo, [facility] Incoming Lab Results From [facility]     Component Results     Component Collected Lab   Specimen Description 10/12/2017  1:50  [address]   . CLEAN CATCH URINE    Special Requests 10/12/2017  1:50  [address]   NOT REPORTED    Culture  (Abnormal) 10/12/2017  1:50 PM [address] 182 10 to 50,000 CFU/ML     Culture 10/12/2017  1:50 PM 3 [address], [address] [place] [address] [phone]    Status 10/12/2017  1:50  [address]   FINAL 10/13/2017    Organism 10/12/2017  1:50  [address]   CIFR    Testing Performed By     Jing Hood Name Director Address Valid Date Range   [address] [place] [address] [address] [facility] [address] 08/30/17 1201-Present   Culture & Susceptibility     CITROBACTER FREUNDII     Antibiotic Interpretation CHLOE Status   amikacin  NOT REPORTED Final   aztreonam Sensitive <=1 SUSCEPTIBLE Final   ceFAZolin  NOT REPORTED Final   cefTRIAXone Sensitive <=1 SUSCEPTIBLE Final   cefepime  NOT REPORTED Final   ciprofloxacin Sensitive <=0.25 SUSCEPTIBLE Final   ertapenem  NOT REPORTED Final   gentamicin Sensitive <=1 SUSCEPTIBLE Final   meropenem  NOT REPORTED Final   nitrofurantoin Sensitive <=16 SUSCEPTIBLE Final   piperacillin-tazobactam Sensitive <=4 SUSCEPTIBLE Final   tigecycline  NOT REPORTED Final   tobramycin Sensitive <=1 SUSCEPTIBLE Final   trimethoprim-sulfamethoxazole Sensitive <=20 SUSCEPTIBLE Final          Thank you for allowing us to participate in the care of this patient. Please call with questions. Felecia Parmar, MS4       I have examined the patient, reviewed the patient's medical history in detail and updated the computerized patient record. Above exam and data confirmed.     Milli Hunt MD

## 2017-10-19 NOTE — PROGRESS NOTES
Fart[facility]  Occupational Therapy Not Seen Note    Patient not available for Occupational Therapy due to:    [] Testing:    [] Hemodialysis    [] Blood Transfusion in Progress    []Refusal by Patient:    [] Surgery/Procedure:    [] Strict Bedrest    [] Sedation    [] Spine Precautions     [x] Pt being transferred to palliative care at this time. Spoke with pt/family and OT services to be defered. [] Pt independent with functional mobility and functional tasks.  Pt with no OT acute care needs at this time, will defer OT eval.    [x] Other: Comfort care only, activity increases pain per RN Jhon Pereyra, will defer further OT tx    Next Scheduled Treatment: Defer further OT tx    Signature: Ryan Owusu OTR/L

## 2017-10-19 NOTE — PROGRESS NOTES
NEPHROLOGY PROGRESS NOTE      SUBJECTIVE     Pt seen and examined at bedside. Lethargic today. Last dialysis was last Friday  CT brain is showing worsening edema and mass effect with effacement of ventricles and 9mm midline shift    Off 3% saline now-sodium 158-162  Patient is [facility], hospice initiated  Discussed with family, stopping all care, including dialysis    OBJECTIVE     Vitals:    10/18/17 0800 10/18/17 1500 10/18/17 2009 10/19/17 0721   BP: 117/64 (!) 157/61 (!) 107/42 135/61   Pulse: 72 99 80 88   Resp:  18 14 14   Temp: 97.3 °F (36.3 °C) 97.3 °F (36.3 °C) 98.5 °F (36.9 °C) 98.9 °F (37.2 °C)   TempSrc:  Axillary Axillary Axillary   SpO2:    96%   Weight:       Height:         24HR INTAKE/OUTPUT:    No intake or output data in the 24 hours ending 10/19/17 0939  General appearance:lethargic  Respiratory::vesicular breath sounds,no wheeze/crackles  Cardiovascular:S1 S2  Tachycardic, no murmur, rub or gallop  Abdomen:Non tender/non distended. Bowel sounds present  Extremities: No Cyanosis or Clubbing, +R Lower extremity edema  Neurological lethargic, pupils reactive    MEDICATIONS     Scheduled Meds:      Continuous Infusions:      PRN Meds:  HYDROmorphone, fentanNYL **OR** fentanNYL  Home Meds:                Prescriptions Prior to Admission: ondansetron (ZOFRAN ODT) 4 MG disintegrating tablet, Take 1 tablet by mouth every 8 hours as needed for Nausea or Vomiting  Calcium Carbonate-Vit D-Min (CALCIUM 1200 PO), Take 1 capsule by mouth daily  phosphorus (K PHOS NEUTRAL) 155-852-130 MG tablet, Take 1 tablet by mouth daily  Cholecalciferol (VITAMIN D) 2000 UNITS TABS tablet, Take 1 tablet by mouth daily  B Complex-C-Folic Acid (NEPHRO-GEOVANNI) 0.8 MG TABS, Take 1 tablet by mouth daily    INVESTIGATIONS     Last 3 CMP:    Recent Labs      10/17/17   0418   10/18/17   0349  10/18/17   0617  10/18/17   1446   NA  158*   < >  154*  158*  162*   K  4.0   --    --   3.7   --    CL  123*   --    --   123*   --    CO2  20

## 2017-10-20 VITALS
HEART RATE: 109 BPM | DIASTOLIC BLOOD PRESSURE: 61 MMHG | BODY MASS INDEX: 43.77 KG/M2 | SYSTOLIC BLOOD PRESSURE: 125 MMHG | TEMPERATURE: 98.1 F | WEIGHT: 263 LBS | RESPIRATION RATE: 13 BRPM | OXYGEN SATURATION: 88 %

## 2017-10-20 PROCEDURE — 6360000002 HC RX W HCPCS: Performed by: INTERNAL MEDICINE

## 2017-10-20 PROCEDURE — 99232 SBSQ HOSP IP/OBS MODERATE 35: CPT | Performed by: INTERNAL MEDICINE

## 2017-10-20 RX ORDER — TETRAHYDROZOLINE HCL 0.05 %
1 DROPS OPHTHALMIC (EYE) EVERY 4 HOURS PRN
Status: DISCONTINUED | OUTPATIENT
Start: 2017-10-20 | End: 2017-10-20 | Stop reason: HOSPADM

## 2017-10-20 RX ADMIN — HYDROMORPHONE HYDROCHLORIDE 0.5 MG: 1 INJECTION, SOLUTION INTRAMUSCULAR; INTRAVENOUS; SUBCUTANEOUS at 06:43

## 2017-10-20 RX ADMIN — HYDROMORPHONE HYDROCHLORIDE 0.5 MG: 1 INJECTION, SOLUTION INTRAMUSCULAR; INTRAVENOUS; SUBCUTANEOUS at 17:53

## 2017-10-20 RX ADMIN — HYDROMORPHONE HYDROCHLORIDE 0.5 MG: 1 INJECTION, SOLUTION INTRAMUSCULAR; INTRAVENOUS; SUBCUTANEOUS at 19:06

## 2017-10-20 RX ADMIN — HYDROMORPHONE HYDROCHLORIDE 0.5 MG: 1 INJECTION, SOLUTION INTRAMUSCULAR; INTRAVENOUS; SUBCUTANEOUS at 00:22

## 2017-10-20 RX ADMIN — HYDROMORPHONE HYDROCHLORIDE 0.5 MG: 1 INJECTION, SOLUTION INTRAMUSCULAR; INTRAVENOUS; SUBCUTANEOUS at 14:14

## 2017-10-20 RX ADMIN — HYDROMORPHONE HYDROCHLORIDE 0.5 MG: 1 INJECTION, SOLUTION INTRAMUSCULAR; INTRAVENOUS; SUBCUTANEOUS at 09:47

## 2017-10-20 RX ADMIN — HYDROMORPHONE HYDROCHLORIDE 0.5 MG: 1 INJECTION, SOLUTION INTRAMUSCULAR; INTRAVENOUS; SUBCUTANEOUS at 01:41

## 2017-10-20 RX ADMIN — HYDROMORPHONE HYDROCHLORIDE 0.5 MG: 1 INJECTION, SOLUTION INTRAMUSCULAR; INTRAVENOUS; SUBCUTANEOUS at 05:00

## 2017-10-20 ASSESSMENT — PAIN SCALES - GENERAL: PAINLEVEL_OUTOF10: 8

## 2017-10-20 ASSESSMENT — PAIN SCALES - WONG BAKER: WONGBAKER_NUMERICALRESPONSE: 6

## 2017-10-20 ASSESSMENT — PAIN - FUNCTIONAL ASSESSMENT
PAIN_FUNCTIONAL_ASSESSMENT: FACES

## 2017-10-20 NOTE — CARE COORDINATION
Writer discussed patient's care with Palliative Care Physicians  Physicians would like to discuss patient transfer to facility with family to continue Hospice care   Writer left message for patient's brother Hossein Jones, will await call back.

## 2017-10-20 NOTE — CONSULTS
Palliative Care Inpatient Consult    NAME:  Kenny Owens RECORD NUMBER:  [id number]  AGE: 67 y.o. GENDER: female  : 1945  TODAY'S DATE:  10/20/2017    Reasons for Consultation:    Symptom and/or pain management  Provision of information regarding PC and/or hospice philosophies  Complex, time-intensive communication and interdisciplinary psychosocial support  Clarification of goals of care and/or assistance with difficult decision-making  Guidance in regards to resources and transition(s)    Members of PC team contributing to this consultation are :  Jackson Ordonez Fellow  Dr. Sofiya Rivas Attending  History of Present Illness   Ms. Willy Guillermo is a 67year old lady with a history of Hypertension, ESRD, morbid obesity and chronic hepatitis C who presented with left-sided weakness on 10/11/17. CT-brain revealed an acute right MCA stroke. Sanam Price did receive tPA upon admission. Right MCA thrombectomy was then attempted but was unsuccessful. Admission course has since been complicated by worsening cerebral edema, and sepsis due to UTI. Due to worsening neurologic status, Dr. Scooby Alcantar spoke with Sanam Price' family on 10/14 where he admitted to her poor neurologic prognosis, and her being a poor candidate for surgery. Sanam Price was then made DNR-CCA. The palliative care team was consulted for evaluation for goals of goals of care.     Referred to Palliative Care by   [x] Physician   [] Nursing  [] Family Request   [] Other:      Interval events  -Enrolled with hospice with [facility] yesterday  -Initially considered transfer back to [address] [place] [address] [address] for hospice care, but due to poor short term prognosis, the decision was made to enroll with hospice and stay at [facility]. Fredy's after myself and representatives from [facility] met with Sanam Price' family yesterday  --After evaluating Sanam Price today however, Sanam Price was then deemed to be stable enough for transfer to the inpatient unit at [facility] in [address] [place] [address] [place] to brother Anjum Lewis for our miscommunication yesterday; ultimately Anjum Lewis and the rest of the family agreed with the transfer to the inpatient unit at [facility]  -Otherwise no acute events overnight  -Less responsive today  -Continues hydromorphone 0.5 mg IV every 4 hours scheduled; used two prn dosed over the past 24 hours     There are no active hospital problems to display for this patient. PAST MEDICAL HISTORY      Diagnosis Date    Arthritis     Blood circulation, collateral     legs    Cancer ([facility]     uterine. Hysterectomy in 1978    Chronic kidney disease 2016    Hemodialysis patient ([facility]     [address] [place] tues, thurs, sat    History of blood transfusion     Hypertension     Liver disease     Hepatitis C    MRSA (methicillin resistant staph aureus) culture positive 10/11/2017    nares    Neuromuscular disorder ([facility]     Scleroderma       PAST SURGICAL HISTORY  Past Surgical History:   Procedure Laterality Date    CHOLECYSTECTOMY      DIALYSIS FISTULA CREATION      HYSTERECTOMY      TONSILLECTOMY      TUNNELED VENOUS CATHETER PLACEMENT Right 09/14/2016    Rt. IJ judy split insertion/ exchange over [place]    TUNNELED VENOUS CATHETER PLACEMENT Right 10/10/2016    REMOVAL NONFUNCTIONING HD CATH, EXCANGED WITH NEW       SOCIAL HISTORY  Social History   Substance Use Topics    Smoking status: Never Smoker    Smokeless tobacco: Never Used    Alcohol use No       ALLERGIES  Allergies   Allergen Reactions    Other      disolvable sutures cause infection         MEDICATIONS  Current Medications    HYDROmorphone  0.5 mg Intravenous Q4H     tetrahydrozoline, Glycopyrrolate, HYDROmorphone, LORazepam  IV Drips/Infusions     Home Medications  No current facility-administered medications on file prior to encounter.       Current Outpatient Prescriptions on File Prior to Encounter   Medication Sig Dispense Refill    ondansetron (ZOFRAN ODT) 4 MG intake normal or reduced; LOC full/confusion   _X__30%  Bed Bound; Extensive disease; Total care; intake reduced; LOCfull/confusion  ___20%  Bed Bound; Extensive disease; Total care; intake minimal; Drowsy/coma  ___10%  Bed Bound; Extensive disease; Total care; Mouth care only; Drowsy/coma  ___0       Death      Plan      Palliative Interaction:    Despite John Oakes' being terminal at this point, clinically she is stable for transfer to inpatient hospice. John Oakes' family has agreed to transferring her to the inpatient unit of [facility]. Principle Problem/Diagnosis:  Acute stroke  ESRD    #Symptoms:  -No evidence of discomfort at this time  -On hydromorphone 0.5 mg IV every 4 hours scheduled and every 1 hourly prn  -Frequent repositioning for airway secretions    #Goals of care evaluation   -Hospice with [facility]  -For transfer to inpatient unit at hospitals    # Code Status  [facility]    #Other recommendations  Please call with any palliative questions or concerns. Palliative Care Team is available via perfect serve or via phone. Palliative Care will continue to follow Ms. Martínez's care as needed. Thank you for allowing Palliative Care to participate in the care of Ms. Kirsten Nguyen . Electronically signed by   Natacha Ibanez MD  Palliative Care Team  on 10/20/2017 at 1:20 PM    Palliative care office: [phone]    Attending Physician Statement  The patient was evaluated during rounds, I have discussed the care of the patient, including pertinent history and exam findings with the palliative care team. The patient was independently seen and examined. Family also met .  The note above was reviewed and modified and reflects my evaluation

## 2017-10-20 NOTE — PLAN OF CARE
- The patient was seen today. - The patient has been converted to in - patient Hospice bed under [address]   - The patient remains comfortable  - The patient's niece was in the room with the patient, met and comforted her  - Talked with the patient's nurse and she wanted Visine drop's to continue for the patient to prevent dryness of the eyes  - Visine drops were ordered

## 2017-10-21 NOTE — PROGRESS NOTES
Pt discharged to [facility] hospice per amb. TLC right IJ has been DCD. Temporary Dialysis cath to right ij also DCD earlier after talking with IR. Family took clothing .     DCD per ambulance

## 2017-10-31 NOTE — DISCHARGE SUMMARY
[address] [place] [address]     Department of Internal Medicine - Staff Internal Medicine Service    INPATIENT DISCHARGE SUMMARY        Patient Identification:  Rolando Russo is a 67 y.o. female. :  1945  MRN: [id number]     Acct: [de-identified]   Admit Date:  10/11/2017  Discharge date and time: 10/19/2017  5:04 PM   Attending Provider: No att. providers found                                     Admission Diagnoses:   Acute left-sided weakness [M62.89]    Discharge Diagnoses:   Principal Problem:    Cerebrovascular accident (CVA) due to thrombosis of right middle cerebral artery ([address]  Active Problems:    Left-sided weakness    HTN (hypertension)    Hep C w/o coma, chronic ([address]    ESRD on hemodialysis ([address]    Morbid obesity ([address]    Weakness of extremity    Left-sided neglect    Cerebrovascular accident (CVA) due to embolism of right middle cerebral artery ([address]    Acute cystitis without hematuria    Cerebral edema (HCC)    Acute blood loss anemia    Acute encephalopathy    Thrombocytopenia (HCC)    Morbid obesity, unspecified obesity type ([address]    Lethargy    Nonverbal    Acute ischemic stroke (HCC)    Anemia in stage 5 chronic kidney disease, not on chronic dialysis (HCC)    Comfort measures only status       Consults:   Neurology  Endovascular  Palliative    Brief Inpatient course:    Patient is a 66 yo female with past medical history significant for Hypertension, ESRD on HD TSS schedule, Chronic hep C,  presented with chief complaint of weakness. She is poor historian. Pt pressed life alert button at 15.55 today. Pt last known well time is 15.40. Weakness is on left UE and LE associated with left facial droop and right gaze preference. Otherwise pt is AO X 3 and answering all questions. Pt completed her dialysis session yesterday. NIH is 10. CT Head wo contrast showed dense Rt MCA sign.  Pt was evaluated in the ED and given TPA at 5 pm.    Endovascular consulted and pt had a Right MCA thrombectomy unsuccessful. Nephrology consulted as pt is ESRD and need for dialysis. Repeat imaging showed    CT Head 10/16  Impression:     1. Progressive edema and mass effect of the right MCA territory infarction,  causing effacement of the right lateral ventricle and increasing shift of  midline structures by approximately 9 mm toward the left side  2. Increased crowding of the brainstem compared to prior. 3. No intracranial bleed       Palliative consulted due to patients worsening neurological status. Family opted comfort care. Patiluis transferred to hospice bed and later tx to [facility].         Procedures:  Right MCA thrombectomy    Any Hospital Acquired Infections: none    Discharge Functional Status:  stable    Disposition: hospice    Patient Instructions:   Discharge Medication List as of 10/19/2017  5:04 PM      CONTINUE these medications which have NOT CHANGED    Details   ondansetron (ZOFRAN ODT) 4 MG disintegrating tablet Take 1 tablet by mouth every 8 hours as needed for Nausea or Vomiting, Disp-10 tablet, R-0      Calcium Carbonate-Vit D-Min (CALCIUM 1200 PO) Take 1 capsule by mouth daily      phosphorus (K PHOS NEUTRAL) 155-852-130 MG tablet Take 1 tablet by mouth daily      Cholecalciferol (VITAMIN D) 2000 UNITS TABS tablet Take 1 tablet by mouth daily, Disp-30 tablet, R-3      B Complex-C-Folic Acid (NEPHRO-GEOVANNI) 0.8 MG TABS Take 1 tablet by mouth daily, Disp-30 tablet, R-3             Activity: activity as tolerated    Diet: regular diet    Follow-up:    Opal Ponce CNP  [address] [facility] [address]  [address] , [address] [facility] [address]  [place] [address]  [phone]    Go on 12/17/2017  Neuro appt has been made at 11:20am. Please arrive early    Yolanda Vides MD  [address] [place] [address], [facility] [address]  [facility] [address] [place] [address]  [phone]    On 11/20/2021  at 2:00      Follow up labs: none    Follow up imaging: none    Note that over 30 minutes was spent in preparing discharge papers, discussing discharge with patient, medication review, etc.      Eren Zapata MD         Department of Internal Medicine  [facility]         10/30/2017, 9:36 PM

## 2025-01-26 NOTE — H&P
Allergies   Allergen Reactions    Other      disolvable sutures cause infection         Home Meds:   Prior to Admission medications    Medication Sig Start Date End Date Taking? Authorizing Provider   ondansetron (ZOFRAN ODT) 4 MG disintegrating tablet Take 1 tablet by mouth every 8 hours as needed for Nausea or Vomiting 11/19/16   Tate Long MD   Calcium Carbonate-Vit D-Min (CALCIUM 1200 PO) Take 1 capsule by mouth daily    Historical Provider, MD   phosphorus (K PHOS NEUTRAL) 092711-968 MG tablet Take 1 tablet by mouth daily    Historical Provider, MD   Cholecalciferol (VITAMIN D) 2000 UNITS TABS tablet Take 1 tablet by mouth daily 9/17/16   Lucie Ritter MD   B Complex-C-Folic Acid (NEPHRO-GEOVANNI) 0.8 MG TABS Take 1 tablet by mouth daily 9/17/16   Lucie Ritter MD       Social History:   TOBACCO:   reports that she has never smoked. She has never used smokeless tobacco.  ETOH:   reports that she does not drink alcohol. OCCUPATION:      Family History:   History reviewed. No pertinent family history. REVIEW OF SYSTEMS:    Review of Systems   Constitutional: Negative for fever. HENT: Negative for hearing loss. Eyes: Negative for blurred vision. Respiratory: Negative for cough. Cardiovascular: Negative for chest pain. Gastrointestinal: Negative for heartburn. Genitourinary: Negative for dysuria. Musculoskeletal: Negative for myalgias. Neurological: Positive for sensory change, focal weakness and weakness. Negative for dizziness. Endo/Heme/Allergies: Positive for polydipsia. Psychiatric/Behavioral: Negative for depression. Physical Exam:    Vitals: BP (!) 149/79   Pulse 106   Temp 98.1 °F (36.7 °C) (Oral)   Resp 18   Ht 5' 5\" (1.651 m)   Wt 260 lb (117.9 kg)   LMP  (LMP Unknown) Comment: post menopausal  SpO2 97%   BMI 43.27 kg/m²     Physical Examination:   Physical Exam   Constitutional: She is oriented to person, place, and time. She appears well-developed. HENT:   Head: Atraumatic. Neck: Neck supple. Cardiovascular: Normal heart sounds. Tachycardia   Pulmonary/Chest: Breath sounds normal.   Abdominal: Soft. Musculoskeletal: Normal range of motion. Neurological: She is alert and oriented to person, place, and time. Left lower extremity 1/5  Left UE 1/5  Left facial droop   Skin: Skin is warm. Psychiatric: She has a normal mood and affect. Medications:Current Inpatient    Scheduled Meds:   sodium chloride flush  10 mL Intravenous 2 times per day    alteplase  81 mg Intravenous Once     Continuous Infusions:   PRN Meds:sodium chloride flush, dextrose      LABS:-    CBC:   Recent Labs      10/11/17   1605   WBC  6.9   HGB  14.6   PLT  119*     BMP:    Recent Labs      10/11/17   1602  10/11/17   1605   NA   --   138   K   --   3.9   CL   --   97*   CO2   --   24   BUN   --   49*   CREATININE  2.39*  1.80*   GLUCOSE   --   91     Calcium:  Recent Labs      10/11/17   1605   CALCIUM  9.0     Ionized Calcium:No results for input(s): IONCA in the last 72 hours. Magnesium:No results for input(s): MG in the last 72 hours. Phosphorus:No results for input(s): PHOS in the last 72 hours. BNP:No results for input(s): BNP in the last 72 hours. Glucose:  Recent Labs      10/11/17   1602   POCGLU  93     HgbA1C: No results for input(s): LABA1C in the last 72 hours. INR:   Recent Labs      10/11/17   1605   INR  0.9     Hepatic: No results for input(s): ALKPHOS, ALT, AST, PROT, BILITOT, BILIDIR, LABALBU in the last 72 hours. Amylase and Lipase:No results for input(s): LACTA, AMYLASE in the last 72 hours. Lactic Acid: No results for input(s): LACTA in the last 72 hours. CARDIAC ENZYMES:  Recent Labs      10/11/17   1603  10/11/17   1605   CKTOTAL   --   61   CKMB   --   1.3   TROPONINI  0.00   --      BNP: No results for input(s): BNP in the last 72 hours. Lipids: No results for input(s): CHOL, TRIG, HDL, LDLCALC in the last 72 hours.     Invalid input(s): Universal Safety Interventions